# Patient Record
Sex: MALE | Race: WHITE | NOT HISPANIC OR LATINO | Employment: STUDENT | URBAN - METROPOLITAN AREA
[De-identification: names, ages, dates, MRNs, and addresses within clinical notes are randomized per-mention and may not be internally consistent; named-entity substitution may affect disease eponyms.]

---
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
## 2021-04-23 ENCOUNTER — OFFICE VISIT (OUTPATIENT)
Dept: FAMILY MEDICINE CLINIC | Facility: CLINIC | Age: 21
End: 2021-04-23
Payer: COMMERCIAL

## 2021-04-23 VITALS
WEIGHT: 156 LBS | DIASTOLIC BLOOD PRESSURE: 60 MMHG | OXYGEN SATURATION: 99 % | BODY MASS INDEX: 25.99 KG/M2 | SYSTOLIC BLOOD PRESSURE: 100 MMHG | HEIGHT: 65 IN | HEART RATE: 81 BPM | TEMPERATURE: 97.5 F

## 2021-04-23 DIAGNOSIS — Z00.01 ENCOUNTER FOR WELL ADULT EXAM WITH ABNORMAL FINDINGS: Primary | ICD-10-CM

## 2021-04-23 PROCEDURE — 99395 PREV VISIT EST AGE 18-39: CPT | Performed by: FAMILY MEDICINE

## 2021-04-23 NOTE — PROGRESS NOTES
ADULT ANNUAL PHYSICAL  [address] PRIMARY CARE [facility]    NAME: Ananth Dailey  AGE: 22 y o  SEX: male  : 2000     DATE: 2021     Assessment and Plan:     Problem List Items Addressed This Visit        Other    Encounter for well adult exam with abnormal findings - Primary      Advice and education were given regarding nutrition, aerobic exercises, weight bearing exercises, cardiovascular risk reduction, fall risk reduction, and age appropriate supplements  The patient was counseled regarding instructions for management, risk factor reductions, prognosis, risks and benefits of treatment options, patient and family education, and importance of compliance with treatment  BMI 26 0-26 9,adult     The BMI is above average  BMI counseling and education was provided to the patient  Nutrition recommendations include reducing portion sizes, decreasing overall calorie intake, 3-5 servings of fruits/vegetables daily, reducing fast food intake, consuming healthier snacks, decreasing soda and/or juice intake, moderation in carbohydrate intake and reducing intake of saturated fat and trans fat  Exercise recommendations include moderate aerobic physical activity for 150 minutes/week, exercising 3-5 times per week and joining a gym  Immunizations and preventive care screenings were discussed with patient today  Appropriate education was printed on patient's after visit summary  Counseling:  Alcohol/drug use: discussed moderation in alcohol intake, the recommendations for healthy alcohol use, and avoidance of illicit drug use  Dental Health: discussed importance of regular tooth brushing, flossing, and dental visits  Injury prevention: discussed safety/seat belts, safety helmets, smoke detectors, carbon dioxide detectors, and smoking near bedding or upholstery    Sexual health: discussed sexually transmitted diseases, partner selection, use of condoms, avoidance of unintended pregnancy, and contraceptive alternatives  · Exercise: the importance of regular exercise/physical activity was discussed  Recommend exercise 3-5 times per week for at least 30 minutes  BMI Counseling: Body mass index is 26 36 kg/m²  The BMI is above normal  Nutrition recommendations include decreasing portion sizes, decreasing fast food intake and limiting drinks that contain sugar  Exercise recommendations include exercising 3-5 times per week  No pharmacotherapy was ordered  No follow-ups on file  Chief Complaint:     Chief Complaint   Patient presents with    Physical Exam      History of Present Illness:     Adult Annual Physical   Patient here for a comprehensive physical exam  The patient reports no problems  Diet and Physical Activity  · Diet/Nutrition: well balanced diet  · Exercise: no formal exercise  Depression Screening  PHQ-9 Depression Screening    PHQ-9:   Frequency of the following problems over the past two weeks:      Little interest or pleasure in doing things: 0 - not at all  Feeling down, depressed, or hopeless: 0 - not at all  PHQ-2 Score: 0       General Health  · Sleep: sleeps well  · Hearing: normal - bilateral   · Vision: no vision problems  · Dental: regular dental visits   Health  · History of STDs?: no      Review of Systems:     Review of Systems   Constitutional: Negative for activity change, appetite change, fatigue and fever  HENT: Negative for congestion, ear pain, sinus pressure, sinus pain and sore throat  Eyes: Negative for pain, discharge, redness and itching  Respiratory: Negative for cough, chest tightness, shortness of breath and stridor  Cardiovascular: Negative for chest pain, palpitations and leg swelling  Gastrointestinal: Negative for abdominal pain, blood in stool, constipation, diarrhea and nausea  Endocrine: Negative for heat intolerance and polydipsia  Genitourinary: Negative for dysuria, flank pain, frequency and hematuria  Musculoskeletal: Negative for back pain, joint swelling and neck pain  Skin: Negative for pallor and rash  Neurological: Negative for dizziness, tremors, weakness, numbness and headaches  Hematological: Does not bruise/bleed easily  Psychiatric/Behavioral: Negative for agitation and behavioral problems        Past Medical History:     Past Medical History:   Diagnosis Date    Asthma       Past Surgical History:     Past Surgical History:   Procedure Laterality Date    OTHER SURGICAL HISTORY  11/2014    CHIN/CHEEK SURGERY      Social History:     E-Cigarette/Vaping    E-Cigarette Use Never User      E-Cigarette/Vaping Substances    Nicotine No     THC No     CBD No     Flavoring No     Other No     Unknown No      Social History     Socioeconomic History    Marital status: Single     Spouse name: None    Number of children: None    Years of education: None    Highest education level: None   Occupational History    None   Social Needs    Financial resource strain: Not hard at all   [address] insecurity     Worry: Never true     Inability: Never true    Transportation needs     Medical: No     Non-medical: No   Tobacco Use    Smoking status: Never Smoker    Smokeless tobacco: Never Used   Substance and Sexual Activity    Alcohol use: No     Frequency: Never     Binge frequency: Never    Drug use: No    Sexual activity: Never   Lifestyle    Physical activity     Days per week: 2 days     Minutes per session: 30 min    Stress: Not at all   Relationships    Social connections     Talks on phone: More than three times a week     Gets together: More than three times a week     Attends Bahai service: Never     Active member of club or organization: No     Attends meetings of clubs or organizations: Never     Relationship status: Never     Intimate partner violence     Fear of current or ex partner: No Emotionally abused: No     Physically abused: No     Forced sexual activity: No   Other Topics Concern    None   Social History Narrative    None      Family History:     Family History   Problem Relation Age of Onset    Hyperlipidemia Father     Hypertension Father     Depression Mother       Current Medications:     Current Outpatient Medications   Medication Sig Dispense Refill    albuterol (VENTOLIN HFA) 90 mcg/act inhaler inhale 2 puff by inhalation route  every 4 - 6 hours as needed       No current facility-administered medications for this visit  Allergies: Allergies   Allergen Reactions    No Known Allergies     Other       Physical Exam:     /60   Pulse 81   Temp 97 5 °F (36 4 °C) (Tympanic)   Ht 5' 4 5" (1 638 m)   Wt 70 8 kg (156 lb)   SpO2 99%   BMI 26 36 kg/m²     Physical Exam  Vitals signs and nursing note reviewed  Constitutional:       General: He is not in acute distress  Appearance: Normal appearance  He is not ill-appearing, toxic-appearing or diaphoretic  HENT:      Head: Normocephalic  Right Ear: Tympanic membrane, ear canal and external ear normal  There is no impacted cerumen  Left Ear: Ear canal and external ear normal  There is no impacted cerumen  Nose: Nose normal  No congestion or rhinorrhea  Mouth/Throat:      Mouth: Mucous membranes are moist       Pharynx: Oropharynx is clear  No oropharyngeal exudate or posterior oropharyngeal erythema  Eyes:      General:         Right eye: No discharge  Left eye: No discharge  Conjunctiva/sclera: Conjunctivae normal       Pupils: Pupils are equal, round, and reactive to light  Neck:      Musculoskeletal: Normal range of motion  Vascular: No JVD  Cardiovascular:      Rate and Rhythm: Normal rate and regular rhythm  Heart sounds: Normal heart sounds  No murmur  Pulmonary:      Effort: Pulmonary effort is normal       Breath sounds: Normal breath sounds   No wheezing or rales  Abdominal:      General: There is no distension  Palpations: Abdomen is soft  Tenderness: There is no abdominal tenderness  Hernia: No hernia is present  Genitourinary:     Scrotum/Testes: Normal    Musculoskeletal: Normal range of motion  General: No deformity  Right lower leg: No edema  Left lower leg: No edema  Lymphadenopathy:      Cervical: No cervical adenopathy  Skin:     General: Skin is warm  Coloration: Skin is not jaundiced  Findings: No bruising, erythema or rash  Neurological:      General: No focal deficit present  Mental Status: He is alert and oriented to person, place, and time  Sensory: No sensory deficit  Motor: No weakness        Gait: Gait normal    Psychiatric:         Mood and Affect: Mood normal          Behavior: Behavior normal           Reinier Berumen MD   [address] [place] [address]

## 2021-04-23 NOTE — PATIENT INSTRUCTIONS

## 2021-06-09 ENCOUNTER — OFFICE VISIT (OUTPATIENT)
Dept: FAMILY MEDICINE CLINIC | Facility: CLINIC | Age: 21
End: 2021-06-09
Payer: COMMERCIAL

## 2021-06-09 VITALS
OXYGEN SATURATION: 97 % | DIASTOLIC BLOOD PRESSURE: 66 MMHG | RESPIRATION RATE: 16 BRPM | HEIGHT: 65 IN | WEIGHT: 155.8 LBS | HEART RATE: 63 BPM | BODY MASS INDEX: 25.96 KG/M2 | SYSTOLIC BLOOD PRESSURE: 112 MMHG | TEMPERATURE: 97.6 F

## 2021-06-09 DIAGNOSIS — Z13.220 ENCOUNTER FOR SCREENING FOR LIPID DISORDER: ICD-10-CM

## 2021-06-09 DIAGNOSIS — R53.83 OTHER FATIGUE: Primary | ICD-10-CM

## 2021-06-09 PROCEDURE — 99214 OFFICE O/P EST MOD 30 MIN: CPT | Performed by: FAMILY MEDICINE

## 2021-06-09 NOTE — PROGRESS NOTES
Subjective:   Chief Complaint   Patient presents with    Fatigue        Patient ID: Eve Whitley is a 22 y o  male  Fatigue  This is a new problem  The problem occurs intermittently  The problem has been unchanged  Associated symptoms include fatigue  Pertinent negatives include no abdominal pain, arthralgias, change in bowel habit, chest pain, chills, congestion, coughing, fever, headaches, joint swelling, myalgias, nausea, neck pain, numbness, rash, sore throat, urinary symptoms, vertigo, visual change, vomiting or weakness  Exacerbated by: not sleeping well  He has tried nothing for the symptoms  The following portions of the patient's history were reviewed and updated as appropriate: allergies, current medications, past family history, past medical history, past social history, past surgical history and problem list     Review of Systems   Constitutional: Positive for fatigue  Negative for activity change, appetite change, chills and fever  HENT: Negative for congestion, ear pain, sinus pressure, sinus pain and sore throat  Eyes: Negative for pain, discharge, redness and itching  Respiratory: Negative for cough, chest tightness, shortness of breath and stridor  Cardiovascular: Negative for chest pain, palpitations and leg swelling  Gastrointestinal: Negative for abdominal pain, blood in stool, change in bowel habit, constipation, diarrhea, nausea and vomiting  Genitourinary: Negative for dysuria, flank pain, frequency and hematuria  Musculoskeletal: Negative for arthralgias, back pain, joint swelling, myalgias and neck pain  Skin: Negative for pallor and rash  Neurological: Negative for dizziness, vertigo, tremors, weakness, numbness and headaches  Hematological: Does not bruise/bleed easily               Objective:  Vitals:    06/09/21 1338   BP: 112/66   BP Location: Left arm   Patient Position: Sitting   Cuff Size: Adult   Pulse: 63   Resp: 16   Temp: 97 6 °F (36 4 °C) TempSrc: Tympanic   SpO2: 97%   Weight: 70 7 kg (155 lb 12 8 oz)   Height: 5' 4 5" (1 638 m)      Physical Exam  Vitals signs and nursing note reviewed  Constitutional:       General: He is not in acute distress  Appearance: Normal appearance  He is well-developed  He is not diaphoretic  HENT:      Head: Normocephalic  Right Ear: Tympanic membrane, ear canal and external ear normal       Left Ear: Tympanic membrane, ear canal and external ear normal       Nose: Nose normal  No congestion or rhinorrhea  Mouth/Throat:      Mouth: Mucous membranes are moist       Pharynx: Oropharynx is clear  No oropharyngeal exudate or posterior oropharyngeal erythema  Eyes:      General:         Right eye: No discharge  Left eye: No discharge  Conjunctiva/sclera: Conjunctivae normal    Neck:      Musculoskeletal: Normal range of motion and neck supple  Vascular: No JVD  Cardiovascular:      Rate and Rhythm: Normal rate and regular rhythm  Heart sounds: Normal heart sounds  No murmur  No gallop  Pulmonary:      Effort: Pulmonary effort is normal  No respiratory distress  Breath sounds: Normal breath sounds  No stridor  No wheezing or rales  Chest:      Chest wall: No tenderness  Abdominal:      General: There is no distension  Palpations: Abdomen is soft  There is no mass  Tenderness: There is no abdominal tenderness  There is no rebound  Musculoskeletal: Normal range of motion  General: No tenderness  Lymphadenopathy:      Cervical: No cervical adenopathy  Skin:     General: Skin is warm  Findings: No erythema or rash  Neurological:      Mental Status: He is alert and oriented to person, place, and time  Sensory: No sensory deficit        Gait: Gait normal    Psychiatric:         Mood and Affect: Mood normal          Behavior: Behavior normal            Assessment/Plan:    Other fatigue   A new diagnosis symptomatic discussed the patient workup including will out anemia thyroid problem Lyme disease and vitamin-D deficiency discussed the patient will hydration also discussed with the patient sleeping hygiene       Diagnoses and all orders for this visit:    Other fatigue  -     CBC and differential; Future  -     Basic metabolic panel; Future  -     Lipid Panel with Direct LDL reflex; Future  -     Vitamin D 25 hydroxy; Future  -     Lyme Total Antibody Profile with reflex to WB; Future    Encounter for screening for lipid disorder  -     Lipid Panel with Direct LDL reflex;  Future

## 2021-06-09 NOTE — ASSESSMENT & PLAN NOTE
A new diagnosis symptomatic discussed the patient workup including will out anemia thyroid problem Lyme disease and vitamin-D deficiency discussed the patient will hydration also discussed with the patient sleeping hygiene

## 2021-07-24 ENCOUNTER — APPOINTMENT (OUTPATIENT)
Dept: LAB | Facility: HOSPITAL | Age: 21
End: 2021-07-24
Payer: COMMERCIAL

## 2021-07-24 DIAGNOSIS — Z13.220 ENCOUNTER FOR SCREENING FOR LIPID DISORDER: ICD-10-CM

## 2021-07-24 DIAGNOSIS — R53.83 OTHER FATIGUE: ICD-10-CM

## 2021-07-24 LAB
25(OH)D3 SERPL-MCNC: 32.3 NG/ML (ref 30–100)
ANION GAP SERPL CALCULATED.3IONS-SCNC: 9 MMOL/L (ref 5–14)
BASOPHILS # BLD AUTO: 0.1 THOUSANDS/ΜL (ref 0–0.1)
BASOPHILS NFR BLD AUTO: 1 % (ref 0–1)
BUN SERPL-MCNC: 14 MG/DL (ref 5–25)
CALCIUM SERPL-MCNC: 9.6 MG/DL (ref 8.4–10.2)
CHLORIDE SERPL-SCNC: 101 MMOL/L (ref 97–108)
CHOLEST SERPL-MCNC: 163 MG/DL
CO2 SERPL-SCNC: 29 MMOL/L (ref 22–30)
CREAT SERPL-MCNC: 0.86 MG/DL (ref 0.7–1.5)
EOSINOPHIL # BLD AUTO: 0.1 THOUSAND/ΜL (ref 0–0.4)
EOSINOPHIL NFR BLD AUTO: 2 % (ref 0–6)
ERYTHROCYTE [DISTWIDTH] IN BLOOD BY AUTOMATED COUNT: 13 %
GFR SERPL CREATININE-BSD FRML MDRD: 125 ML/MIN/1.73SQ M
GLUCOSE P FAST SERPL-MCNC: 95 MG/DL (ref 70–99)
HCT VFR BLD AUTO: 42.8 % (ref 41–53)
HDLC SERPL-MCNC: 51 MG/DL
HGB BLD-MCNC: 14.6 G/DL (ref 13.5–17.5)
LDLC SERPL CALC-MCNC: 99 MG/DL
LYMPHOCYTES # BLD AUTO: 1.6 THOUSANDS/ΜL (ref 0.5–4)
LYMPHOCYTES NFR BLD AUTO: 32 % (ref 25–45)
MCH RBC QN AUTO: 28.5 PG (ref 26–34)
MCHC RBC AUTO-ENTMCNC: 34.2 G/DL (ref 31–36)
MCV RBC AUTO: 83 FL (ref 80–100)
MONOCYTES # BLD AUTO: 0.4 THOUSAND/ΜL (ref 0.2–0.9)
MONOCYTES NFR BLD AUTO: 9 % (ref 1–10)
NEUTROPHILS # BLD AUTO: 2.8 THOUSANDS/ΜL (ref 1.8–7.8)
NEUTS SEG NFR BLD AUTO: 57 % (ref 45–65)
PLATELET # BLD AUTO: 194 THOUSANDS/UL (ref 150–450)
PMV BLD AUTO: 9.7 FL (ref 8.9–12.7)
POTASSIUM SERPL-SCNC: 4 MMOL/L (ref 3.6–5)
RBC # BLD AUTO: 5.13 MILLION/UL (ref 4.5–5.9)
SODIUM SERPL-SCNC: 139 MMOL/L (ref 137–147)
TRIGL SERPL-MCNC: 65 MG/DL
WBC # BLD AUTO: 5 THOUSAND/UL (ref 4.5–11)

## 2021-07-24 PROCEDURE — 86618 LYME DISEASE ANTIBODY: CPT

## 2021-07-24 PROCEDURE — 85025 COMPLETE CBC W/AUTO DIFF WBC: CPT

## 2021-07-24 PROCEDURE — 36415 COLL VENOUS BLD VENIPUNCTURE: CPT

## 2021-07-24 PROCEDURE — 80061 LIPID PANEL: CPT

## 2021-07-24 PROCEDURE — 82306 VITAMIN D 25 HYDROXY: CPT

## 2021-07-24 PROCEDURE — 80048 BASIC METABOLIC PNL TOTAL CA: CPT

## 2021-07-27 LAB — B BURGDOR IGG+IGM SER-ACNC: 88

## 2022-04-29 ENCOUNTER — OFFICE VISIT (OUTPATIENT)
Dept: FAMILY MEDICINE CLINIC | Facility: CLINIC | Age: 22
End: 2022-04-29
Payer: COMMERCIAL

## 2022-04-29 VITALS
WEIGHT: 158 LBS | BODY MASS INDEX: 26.33 KG/M2 | HEART RATE: 62 BPM | HEIGHT: 65 IN | TEMPERATURE: 98.8 F | SYSTOLIC BLOOD PRESSURE: 100 MMHG | DIASTOLIC BLOOD PRESSURE: 80 MMHG | OXYGEN SATURATION: 100 %

## 2022-04-29 DIAGNOSIS — E66.3 OVERWEIGHT WITH BODY MASS INDEX (BMI) OF 26 TO 26.9 IN ADULT: ICD-10-CM

## 2022-04-29 DIAGNOSIS — Z00.01 ENCOUNTER FOR WELL ADULT EXAM WITH ABNORMAL FINDINGS: Primary | ICD-10-CM

## 2022-04-29 DIAGNOSIS — J45.20 MILD INTERMITTENT ASTHMA WITHOUT COMPLICATION: ICD-10-CM

## 2022-04-29 PROBLEM — L01.00 IMPETIGO: Status: RESOLVED | Noted: 2020-10-29 | Resolved: 2022-04-29

## 2022-04-29 PROBLEM — R53.83 OTHER FATIGUE: Status: RESOLVED | Noted: 2021-06-09 | Resolved: 2022-04-29

## 2022-04-29 PROCEDURE — 99395 PREV VISIT EST AGE 18-39: CPT | Performed by: FAMILY MEDICINE

## 2022-04-29 RX ORDER — FAMOTIDINE 20 MG/1
TABLET, FILM COATED ORAL
COMMUNITY
Start: 2022-04-22

## 2022-04-29 RX ORDER — LEVOCETIRIZINE DIHYDROCHLORIDE 5 MG/1
TABLET, FILM COATED ORAL
COMMUNITY
Start: 2022-02-01

## 2022-04-29 RX ORDER — ALBUTEROL SULFATE 90 UG/1
2 AEROSOL, METERED RESPIRATORY (INHALATION) EVERY 6 HOURS PRN
Qty: 18 G | Refills: 1 | Status: SHIPPED | OUTPATIENT
Start: 2022-04-29

## 2022-04-29 NOTE — PATIENT INSTRUCTIONS

## 2022-04-29 NOTE — ASSESSMENT & PLAN NOTE
Advice and education were given regarding nutrition, aerobic exercises, weight bearing exercises, cardiovascular risk reduction, fall risk reduction, and age appropriate supplements  The patient was counseled regarding instructions for management, risk factor reductions, prognosis, risks and benefits of treatment options, patient and family education, and importance of compliance with treatment       The patient's history of asthma I discussed with him per Pneumovax vaccine he declined for today

## 2022-04-29 NOTE — ASSESSMENT & PLAN NOTE
BMI 26 29 at discussed the patient portion control low carb low-fat diet and increased physical activity

## 2022-04-29 NOTE — PROGRESS NOTES
ADULT ANNUAL PHYSICAL  [address] PRIMARY CARE [facility]    NAME: Ananth Dailey  AGE: 25 y o  SEX: male  : 2000     DATE: 2022     Assessment and Plan:     Problem List Items Addressed This Visit        Respiratory    Mild intermittent asthma without complication     Chronic fair control patient rarely use albuterol inhaler recommend to have albuterol inhaler Handy at home to use it for emergency         Relevant Medications    albuterol (Ventolin HFA) 90 mcg/act inhaler       Other    Encounter for well adult exam with abnormal findings - Primary     Advice and education were given regarding nutrition, aerobic exercises, weight bearing exercises, cardiovascular risk reduction, fall risk reduction, and age appropriate supplements  The patient was counseled regarding instructions for management, risk factor reductions, prognosis, risks and benefits of treatment options, patient and family education, and importance of compliance with treatment  The patient's history of asthma I discussed with him per Pneumovax vaccine he declined for today         Overweight with body mass index (BMI) of 26 to 26 9 in adult     BMI 26 29 at discussed the patient portion control low carb low-fat diet and increased physical activity               Immunizations and preventive care screenings were discussed with patient today  Appropriate education was printed on patient's after visit summary  Counseling:  Alcohol/drug use: discussed moderation in alcohol intake, the recommendations for healthy alcohol use, and avoidance of illicit drug use  Dental Health: discussed importance of regular tooth brushing, flossing, and dental visits  Injury prevention: discussed safety/seat belts, safety helmets, smoke detectors, carbon dioxide detectors, and smoking near bedding or upholstery    Sexual health: discussed sexually transmitted diseases, partner selection, use of condoms, avoidance of unintended pregnancy, and contraceptive alternatives  Exercise: the importance of regular exercise/physical activity was discussed  Recommend exercise 3-5 times per week for at least 30 minutes  BMI Counseling: Body mass index is 26 29 kg/m²  The BMI is above normal  Nutrition recommendations include decreasing portion sizes, decreasing fast food intake and limiting drinks that contain sugar  Exercise recommendations include exercising 3-5 times per week  No pharmacotherapy was ordered  Rationale for BMI follow-up plan is due to patient being overweight or obese  Depression Screening and Follow-up Plan: Patient was screened for depression during today's encounter  They screened negative with a PHQ-2 score of 0  No follow-ups on file  Chief Complaint:     Chief Complaint   Patient presents with    Physical Exam      History of Present Illness:     Adult Annual Physical   Patient here for a comprehensive physical exam  The patient reports no problems  Diet and Physical Activity  Diet/Nutrition: no special diet  Exercise: 1-2 times a week on average  Depression Screening  PHQ-2/9 Depression Screening    Little interest or pleasure in doing things: 0 - not at all  Feeling down, depressed, or hopeless: 0 - not at all  PHQ-2 Score: 0  PHQ-2 Interpretation: Negative depression screen       General Health  Sleep: sleeps well  Hearing: normal - bilateral   Vision: no vision problems  Dental: regular dental visits   Health  History of STDs?: no      Review of Systems:     Review of Systems   Constitutional: Negative for activity change, appetite change, fatigue and fever  HENT: Negative for congestion, ear pain, sinus pressure, sinus pain and sore throat  Eyes: Negative for pain, discharge, redness and itching  Respiratory: Negative for cough, chest tightness, shortness of breath and stridor      Cardiovascular: Negative for chest pain, palpitations and leg swelling  Gastrointestinal: Negative for abdominal pain, blood in stool, constipation, diarrhea and nausea  Genitourinary: Negative for dysuria, flank pain, frequency and hematuria  Musculoskeletal: Negative for back pain, joint swelling and neck pain  Skin: Negative for pallor and rash  Neurological: Negative for dizziness, tremors, weakness, numbness and headaches  Hematological: Does not bruise/bleed easily  Past Medical History:     Past Medical History:   Diagnosis Date    Asthma     Impetigo 10/29/2020    Other fatigue 6/9/2021      Past Surgical History:     Past Surgical History:   Procedure Laterality Date    OTHER SURGICAL HISTORY  11/2014    CHIN/CHEEK SURGERY      Social History:     Social History     Socioeconomic History    Marital status: Single     Spouse name: None    Number of children: None    Years of education: None    Highest education level: None   Occupational History    None   Tobacco Use    Smoking status: Never Smoker    Smokeless tobacco: Never Used   Vaping Use    Vaping Use: Never used   Substance and Sexual Activity    Alcohol use: No    Drug use: No    Sexual activity: Never   Other Topics Concern    None   Social History Narrative    None     Social Determinants of Health     Financial Resource Strain: Low Risk     Difficulty of Paying Living Expenses: Not hard at all   Food Insecurity: No Food Insecurity    Worried About Running Out of Food in the Last Year: Never true    Jadiel of Food in the Last Year: Never true   Transportation Needs: No Transportation Needs    Lack of Transportation (Medical): No    Lack of Transportation (Non-Medical):  No   Physical Activity: Not on file   Stress: Not on file   Social Connections: Not on file   Intimate Partner Violence: Not on file   Housing Stability: Not on file      Family History:     Family History   Problem Relation Age of Onset    Hyperlipidemia Father     Hypertension Father    Yamile Tavarez Depression Mother       Current Medications:     Current Outpatient Medications   Medication Sig Dispense Refill    famotidine (PEPCID) 20 mg tablet       levocetirizine (XYZAL) 5 MG tablet TAKE 1 TABLET (5 MG) BY MOUTH ONE TIME TO 4 TIMES DAILY FOR HIVES      albuterol (Ventolin HFA) 90 mcg/act inhaler Inhale 2 puffs every 6 (six) hours as needed for wheezing 18 g 1     No current facility-administered medications for this visit  Allergies: Allergies   Allergen Reactions    No Known Allergies     Other       Physical Exam:     /80   Pulse 62   Temp 98 8 °F (37 1 °C) (Tympanic)   Ht 5' 5" (1 651 m)   Wt 71 7 kg (158 lb)   SpO2 100%   BMI 26 29 kg/m²     Physical Exam  Vitals and nursing note reviewed  Constitutional:       General: He is not in acute distress  Appearance: Normal appearance  He is not ill-appearing, toxic-appearing or diaphoretic  HENT:      Head: Normocephalic  Right Ear: Tympanic membrane, ear canal and external ear normal  There is no impacted cerumen  Left Ear: Ear canal and external ear normal  There is no impacted cerumen  Nose: Nose normal  No congestion or rhinorrhea  Mouth/Throat:      Mouth: Mucous membranes are moist       Pharynx: Oropharynx is clear  No oropharyngeal exudate or posterior oropharyngeal erythema  Eyes:      General:         Right eye: No discharge  Left eye: No discharge  Conjunctiva/sclera: Conjunctivae normal       Pupils: Pupils are equal, round, and reactive to light  Neck:      Vascular: No JVD  Cardiovascular:      Rate and Rhythm: Normal rate and regular rhythm  Heart sounds: Normal heart sounds  No murmur heard  Pulmonary:      Effort: Pulmonary effort is normal       Breath sounds: Normal breath sounds  No wheezing or rales  Abdominal:      General: There is no distension  Palpations: Abdomen is soft  Tenderness: There is no abdominal tenderness        Hernia: No hernia is present  Musculoskeletal:         General: No deformity  Normal range of motion  Cervical back: Normal range of motion  Right lower leg: No edema  Left lower leg: No edema  Lymphadenopathy:      Cervical: No cervical adenopathy  Skin:     General: Skin is warm  Coloration: Skin is not jaundiced  Findings: No bruising, erythema or rash  Neurological:      General: No focal deficit present  Mental Status: He is alert and oriented to person, place, and time  Sensory: No sensory deficit  Motor: No weakness        Gait: Gait normal    Psychiatric:         Mood and Affect: Mood normal          Behavior: Behavior normal           Peggy Wu MD   [address] [place] [address]

## 2022-04-29 NOTE — ASSESSMENT & PLAN NOTE
Chronic fair control patient rarely use albuterol inhaler recommend to have albuterol inhaler Handy at home to use it for emergency

## 2022-07-27 DIAGNOSIS — Z11.1 SCREENING-PULMONARY TB: Primary | ICD-10-CM

## 2022-08-01 ENCOUNTER — APPOINTMENT (OUTPATIENT)
Dept: LAB | Facility: HOSPITAL | Age: 22
End: 2022-08-01
Payer: COMMERCIAL

## 2022-08-01 DIAGNOSIS — Z11.1 SCREENING-PULMONARY TB: ICD-10-CM

## 2022-08-01 PROCEDURE — 86480 TB TEST CELL IMMUN MEASURE: CPT

## 2022-08-01 PROCEDURE — 36415 COLL VENOUS BLD VENIPUNCTURE: CPT

## 2022-08-03 LAB
GAMMA INTERFERON BACKGROUND BLD IA-ACNC: 0.03 IU/ML
M TB IFN-G BLD-IMP: NEGATIVE
M TB IFN-G CD4+ BCKGRND COR BLD-ACNC: -0.01 IU/ML
M TB IFN-G CD4+ BCKGRND COR BLD-ACNC: 0 IU/ML
MITOGEN IGNF BCKGRD COR BLD-ACNC: 9.84 IU/ML

## 2022-08-29 ENCOUNTER — TELEPHONE (OUTPATIENT)
Dept: FAMILY MEDICINE CLINIC | Facility: CLINIC | Age: 22
End: 2022-08-29

## 2022-08-29 DIAGNOSIS — R19.7 DIARRHEA, UNSPECIFIED TYPE: Primary | ICD-10-CM

## 2022-08-29 NOTE — TELEPHONE ENCOUNTER
Having GI intolerance to dairy product, GI upset and Diarrhea  [facility]      Please review  Thank you

## 2022-08-29 NOTE — TELEPHONE ENCOUNTER
Patient informed of Dr Wendy Zuluaga recommendations  Patient would like to be referred to GI for evaluation  GI referral placed

## 2023-01-06 ENCOUNTER — TELEPHONE (OUTPATIENT)
Dept: FAMILY MEDICINE CLINIC | Facility: CLINIC | Age: 23
End: 2023-01-06

## 2023-01-06 DIAGNOSIS — Z11.1 SCREENING-PULMONARY TB: Primary | ICD-10-CM

## 2023-01-06 NOTE — TELEPHONE ENCOUNTER
Patient's father called, patient needs a TB test for TERRI HENDRICKSON  [facility]  He is asking if you can order this for him to have done    Thank you

## 2023-06-09 ENCOUNTER — OFFICE VISIT (OUTPATIENT)
Dept: FAMILY MEDICINE CLINIC | Facility: CLINIC | Age: 23
End: 2023-06-09
Payer: COMMERCIAL

## 2023-06-09 VITALS
DIASTOLIC BLOOD PRESSURE: 60 MMHG | HEART RATE: 67 BPM | WEIGHT: 155 LBS | OXYGEN SATURATION: 98 % | BODY MASS INDEX: 25.83 KG/M2 | HEIGHT: 65 IN | TEMPERATURE: 96.6 F | SYSTOLIC BLOOD PRESSURE: 100 MMHG

## 2023-06-09 DIAGNOSIS — E66.3 OVERWEIGHT (BMI 25.0-29.9): ICD-10-CM

## 2023-06-09 DIAGNOSIS — Z00.01 ENCOUNTER FOR WELL ADULT EXAM WITH ABNORMAL FINDINGS: Primary | ICD-10-CM

## 2023-06-09 DIAGNOSIS — J45.20 MILD INTERMITTENT ASTHMA WITHOUT COMPLICATION: ICD-10-CM

## 2023-06-09 DIAGNOSIS — J30.1 SEASONAL ALLERGIC RHINITIS DUE TO POLLEN: ICD-10-CM

## 2023-06-09 PROCEDURE — 99395 PREV VISIT EST AGE 18-39: CPT | Performed by: FAMILY MEDICINE

## 2023-06-09 RX ORDER — FAMOTIDINE 20 MG/1
20 TABLET, FILM COATED ORAL DAILY
Qty: 90 TABLET | Refills: 0 | Status: SHIPPED | OUTPATIENT
Start: 2023-06-09

## 2023-06-09 RX ORDER — LEVOCETIRIZINE DIHYDROCHLORIDE 5 MG/1
5 TABLET, FILM COATED ORAL 4 TIMES DAILY PRN
Qty: 90 TABLET | Refills: 0 | Status: CANCELLED | OUTPATIENT
Start: 2023-06-09

## 2023-06-09 RX ORDER — LEVOCETIRIZINE DIHYDROCHLORIDE 5 MG/1
5 TABLET, FILM COATED ORAL EVERY EVENING
Qty: 90 TABLET | Refills: 0 | Status: SHIPPED | OUTPATIENT
Start: 2023-06-09

## 2023-06-09 NOTE — ASSESSMENT & PLAN NOTE
BMI today 25 79 discussed with the patient portion control low-carb low-fat diet increase physical activity improved compared with before

## 2023-06-09 NOTE — PROGRESS NOTES
ADULT ANNUAL PHYSICAL  [address] PRIMARY CARE [facility]    NAME: Ananth Dailey  AGE: 26 y o  SEX: male  : 2000     DATE: 2023     Assessment and Plan:     Problem List Items Addressed This Visit        Respiratory    Allergic rhinitis    Relevant Medications    famotidine (PEPCID) 20 mg tablet    levocetirizine (XYZAL) 5 MG tablet    Mild intermittent asthma without complication       Other    Encounter for well adult exam with abnormal findings - Primary     Advice and education were given regarding nutrition, aerobic exercises, weight-bearing exercises, cardiovascular risk reduction, fall risk reduction, and age-appropriate supplements  The patient was counseled regarding instructions for management, risk factor reductions, prognosis, risks and benefits of treatment options, patient and family education, and importance of compliance with treatment  Overweight (BMI 25 0-29  9)     BMI today 25 79 discussed with the patient portion control low-carb low-fat diet increase physical activity improved compared with before            Immunizations and preventive care screenings were discussed with patient today  Appropriate education was printed on patient's after visit summary  Counseling:  Alcohol/drug use: discussed moderation in alcohol intake, the recommendations for healthy alcohol use, and avoidance of illicit drug use  Dental Health: discussed importance of regular tooth brushing, flossing, and dental visits  Injury prevention: discussed safety/seat belts, safety helmets, smoke detectors, carbon dioxide detectors, and smoking near bedding or upholstery  Sexual health: discussed sexually transmitted diseases, partner selection, use of condoms, avoidance of unintended pregnancy, and contraceptive alternatives  Exercise: the importance of regular exercise/physical activity was discussed   Recommend exercise 3-5 times per week for at least 30 minutes  BMI Counseling: Body mass index is 25 79 kg/m²  The BMI is above normal  Nutrition recommendations include decreasing portion sizes, decreasing fast food intake and limiting drinks that contain sugar  Exercise recommendations include exercising 3-5 times per week  No pharmacotherapy was ordered  Rationale for BMI follow-up plan is due to patient being overweight or obese  Depression Screening and Follow-up Plan: Patient was screened for depression during today's encounter  They screened negative with a PHQ-2 score of 0  Return in about 1 year (around 6/9/2024)  Chief Complaint:     Chief Complaint   Patient presents with   • Physical Exam      History of Present Illness:     Adult Annual Physical   Patient here for a comprehensive physical exam  The patient reports no problems  Diet and Physical Activity  Diet/Nutrition: well balanced diet  Exercise: 1-2 times a week on average  Depression Screening  PHQ-2/9 Depression Screening    Little interest or pleasure in doing things: 0 - not at all  Feeling down, depressed, or hopeless: 0 - not at all  PHQ-2 Score: 0  PHQ-2 Interpretation: Negative depression screen       General Health  Sleep: sleeps well  Hearing: normal - bilateral   Vision: no vision problems  Dental: regular dental visits   Health  History of STDs?: no      Review of Systems:     Review of Systems   Constitutional: Negative for chills and fever  HENT: Negative for ear pain and sore throat  Eyes: Negative for pain and visual disturbance  Respiratory: Negative for cough and shortness of breath  Cardiovascular: Negative for chest pain and palpitations  Gastrointestinal: Negative for abdominal pain, constipation, diarrhea and vomiting  Genitourinary: Negative for dysuria and hematuria  Musculoskeletal: Negative for arthralgias and back pain  Skin: Negative for color change and rash     Neurological: Negative for seizures and syncope  Hematological: Does not bruise/bleed easily  Psychiatric/Behavioral: Negative for behavioral problems  All other systems reviewed and are negative  Past Medical History:     Past Medical History:   Diagnosis Date   • Asthma    • Impetigo 10/29/2020   • Other fatigue 6/9/2021      Past Surgical History:     Past Surgical History:   Procedure Laterality Date   • OTHER SURGICAL HISTORY  11/2014    CHIN/CHEEK SURGERY      Social History:     Social History     Socioeconomic History   • Marital status: Single     Spouse name: None   • Number of children: None   • Years of education: None   • Highest education level: None   Occupational History   • None   Tobacco Use   • Smoking status: Never   • Smokeless tobacco: Never   Vaping Use   • Vaping Use: Never used   Substance and Sexual Activity   • Alcohol use: Yes     Comment: social   • Drug use: No   • Sexual activity: Never   Other Topics Concern   • None   Social History Narrative   • None     Social Determinants of Health     Financial Resource Strain: Low Risk  (6/9/2021)    Overall Financial Resource Strain (CARDIA)    • Difficulty of Paying Living Expenses: Not hard at all   Food Insecurity: No Food Insecurity (6/9/2021)    Hunger Vital Sign    • Worried About Running Out of Food in the Last Year: Never true    • Ran Out of Food in the Last Year: Never true   Transportation Needs: No Transportation Needs (6/9/2021)    PRAPARE - Transportation    • Lack of Transportation (Medical): No    • Lack of Transportation (Non-Medical):  No   Physical Activity: Insufficiently Active (4/23/2021)    Exercise Vital Sign    • Days of Exercise per Week: 2 days    • Minutes of Exercise per Session: 30 min   Stress: No Stress Concern Present (4/23/2021)    [address]    • Feeling of Stress : Not at all   Social Connections: Socially Isolated (4/23/2021)    Social Connection and Isolation Panel "[NHANES]    • Frequency of Communication with Friends and Family: More than three times a week    • Frequency of Social Gatherings with Friends and Family: More than three times a week    • Attends Sabianism Services: Never    • Active Member of Clubs or Organizations: No    • Attends Club or Organization Meetings: Never    • Marital Status: Never    Intimate Partner Violence: Not At Risk (4/23/2021)    Humiliation, Afraid, Rape, and Kick questionnaire    • Fear of Current or Ex-Partner: No    • Emotionally Abused: No    • Physically Abused: No    • Sexually Abused: No   Housing Stability: Not on file      Family History:     Family History   Problem Relation Age of Onset   • Hyperlipidemia Father    • Hypertension Father    • Depression Mother       Current Medications:     Current Outpatient Medications   Medication Sig Dispense Refill   • famotidine (PEPCID) 20 mg tablet Take 1 tablet (20 mg total) by mouth daily 90 tablet 0   • levocetirizine (XYZAL) 5 MG tablet Take 1 tablet (5 mg total) by mouth every evening 90 tablet 0   • albuterol (Ventolin HFA) 90 mcg/act inhaler Inhale 2 puffs every 6 (six) hours as needed for wheezing 18 g 1     No current facility-administered medications for this visit  Allergies: Allergies   Allergen Reactions   • No Known Allergies    • Other       Physical Exam:     /60 (BP Location: Left arm, Patient Position: Sitting)   Pulse 67   Temp (!) 96 6 °F (35 9 °C) (Tympanic)   Ht 5' 5\" (1 651 m)   Wt 70 3 kg (155 lb)   SpO2 98%   BMI 25 79 kg/m²     Physical Exam  Vitals and nursing note reviewed  Constitutional:       General: He is not in acute distress  Appearance: He is well-developed  HENT:      Head: Normocephalic and atraumatic  Right Ear: Tympanic membrane normal  There is no impacted cerumen  Left Ear: Tympanic membrane normal  There is no impacted cerumen  Nose: No congestion        Mouth/Throat:      Pharynx: No oropharyngeal " exudate  Eyes:      General:         Right eye: No discharge  Left eye: No discharge  Conjunctiva/sclera: Conjunctivae normal    Cardiovascular:      Rate and Rhythm: Normal rate and regular rhythm  Heart sounds: No murmur heard  Pulmonary:      Effort: Pulmonary effort is normal  No respiratory distress  Breath sounds: Normal breath sounds  Abdominal:      Palpations: Abdomen is soft  Tenderness: There is no abdominal tenderness  Musculoskeletal:         General: No swelling  Cervical back: Neck supple  Skin:     General: Skin is warm and dry  Capillary Refill: Capillary refill takes less than 2 seconds  Findings: No erythema or rash  Neurological:      Mental Status: He is alert and oriented to person, place, and time     Psychiatric:         Mood and Affect: Mood normal           Dayana Brizuela MD   [address] [place] [address]

## 2023-08-22 ENCOUNTER — APPOINTMENT (OUTPATIENT)
Dept: LAB | Facility: HOSPITAL | Age: 23
End: 2023-08-22
Payer: COMMERCIAL

## 2023-08-22 DIAGNOSIS — Z11.1 SCREENING-PULMONARY TB: ICD-10-CM

## 2023-08-22 PROCEDURE — 36415 COLL VENOUS BLD VENIPUNCTURE: CPT

## 2023-08-22 PROCEDURE — 86480 TB TEST CELL IMMUN MEASURE: CPT

## 2023-08-24 LAB
GAMMA INTERFERON BACKGROUND BLD IA-ACNC: 0.02 IU/ML
M TB IFN-G BLD-IMP: NEGATIVE
M TB IFN-G CD4+ BCKGRND COR BLD-ACNC: 0 IU/ML
M TB IFN-G CD4+ BCKGRND COR BLD-ACNC: 0 IU/ML
MITOGEN IGNF BCKGRD COR BLD-ACNC: >10 IU/ML

## 2023-11-17 ENCOUNTER — OFFICE VISIT (OUTPATIENT)
Dept: FAMILY MEDICINE CLINIC | Facility: CLINIC | Age: 23
End: 2023-11-17
Payer: COMMERCIAL

## 2023-11-17 VITALS
SYSTOLIC BLOOD PRESSURE: 100 MMHG | HEIGHT: 65 IN | HEART RATE: 59 BPM | TEMPERATURE: 98 F | WEIGHT: 154 LBS | BODY MASS INDEX: 25.66 KG/M2 | DIASTOLIC BLOOD PRESSURE: 70 MMHG | OXYGEN SATURATION: 100 %

## 2023-11-17 DIAGNOSIS — Z23 ENCOUNTER FOR IMMUNIZATION: ICD-10-CM

## 2023-11-17 DIAGNOSIS — R55 SYNCOPE, UNSPECIFIED SYNCOPE TYPE: Primary | ICD-10-CM

## 2023-11-17 DIAGNOSIS — Z23 NEED FOR INFLUENZA VACCINATION: ICD-10-CM

## 2023-11-17 PROCEDURE — 90686 IIV4 VACC NO PRSV 0.5 ML IM: CPT | Performed by: FAMILY MEDICINE

## 2023-11-17 PROCEDURE — 90471 IMMUNIZATION ADMIN: CPT | Performed by: FAMILY MEDICINE

## 2023-11-17 PROCEDURE — 99214 OFFICE O/P EST MOD 30 MIN: CPT | Performed by: FAMILY MEDICINE

## 2023-11-17 PROCEDURE — 93000 ELECTROCARDIOGRAM COMPLETE: CPT | Performed by: FAMILY MEDICINE

## 2023-11-17 NOTE — PROGRESS NOTES
Name: Gloria Mcneil      : 2000      MRN: [id number]  Encounter Provider: Concetta Osorio MD  Encounter Date: 2023   Encounter department: [address] [place] [address]     1. Syncope, unspecified syncope type  Assessment & Plan:  New diagnosis symptomatic patient loss conscious for couple seconds no lose control of the urine or stool no headache, no fatigue and he wake up there is a family history of mother has a cardiac condition  EKG in the office normal sinus rhythm recommend to do Holter monitor for 4 weeks echocardiogram plan to do blood work to rule out anemia electrolyte and check sugar level plan for MRI of the brain there is a positive family history of seizure disorder    Orders:  -     POCT ECG  -     MRI brain wo contrast; Future; Expected date: 2023  -     Echo complete w/ contrast if indicated; Future; Expected date: 2023  -     Holter monitor; Future; Expected date: 2023  -     CBC and differential; Future  -     Lipid panel; Future  -     Basic metabolic panel; Future  -     TSH, 3rd generation with Free T4 reflex; Future    2. Encounter for immunization  -     FLUZONE: influenza vaccine, quadrivalent, 0.5 mL    3. Need for influenza vaccination  -     FLUZONE: influenza vaccine, quadrivalent, 0.5 mL        Depression Screening and Follow-up Plan: Patient was screened for depression during today's encounter. They screened negative with a PHQ-2 score of 0.         Subjective      Patient here in the office concerned about the symptoms conscious sedation patient he was visiting his get [facility] and [facility] to stretch and atorvastatin decompensated on the floor but he lost his consciousness for couple seconds when he wake up and noticed control of the urine or stool he did not feel fatigue and this is the first time happened to him no nausea no vomiting no diarrhea he is not drinking enough water there is a positive family history of his mother has a cardiac problem       Review of Systems   Constitutional:  Negative for chills and fever. HENT:  Negative for ear pain and sore throat. Eyes:  Negative for pain and visual disturbance. Respiratory:  Negative for cough and shortness of breath. Cardiovascular:  Negative for chest pain and palpitations. Gastrointestinal:  Negative for abdominal pain, blood in stool, constipation, diarrhea and vomiting. Genitourinary:  Negative for dysuria and hematuria. Musculoskeletal:  Negative for arthralgias and back pain. Skin:  Negative for color change and rash. Neurological:  Positive for syncope. Negative for dizziness, tremors, seizures, weakness, light-headedness, numbness and headaches. Psychiatric/Behavioral:  Negative for behavioral problems. All other systems reviewed and are negative. Current Outpatient Medications on File Prior to Visit   Medication Sig   • famotidine (PEPCID) 20 mg tablet Take 1 tablet (20 mg total) by mouth daily   • levocetirizine (XYZAL) 5 MG tablet Take 1 tablet (5 mg total) by mouth every evening       Objective     /70 (BP Location: Left arm, Patient Position: Sitting)   Pulse 59   Temp 98 °F (36.7 °C) (Tympanic)   Ht 5' 4.5" (1.638 m)   Wt 69.9 kg (154 lb)   SpO2 100%   BMI 26.03 kg/m²     Physical Exam  Vitals and nursing note reviewed. Constitutional:       General: He is not in acute distress. Appearance: Normal appearance. He is well-developed. He is not diaphoretic. HENT:      Head: Normocephalic. Right Ear: Tympanic membrane, ear canal and external ear normal.      Left Ear: Tympanic membrane, ear canal and external ear normal.      Nose: Nose normal. No congestion or rhinorrhea. Mouth/Throat:      Mouth: Mucous membranes are moist.      Pharynx: Oropharynx is clear. No oropharyngeal exudate or posterior oropharyngeal erythema. Eyes:      General:         Right eye: No discharge. Left eye: No discharge. Conjunctiva/sclera: Conjunctivae normal.   Neck:      Vascular: No JVD. Cardiovascular:      Rate and Rhythm: Normal rate and regular rhythm. Heart sounds: Normal heart sounds. No murmur heard. No gallop. Pulmonary:      Effort: Pulmonary effort is normal. No respiratory distress. Breath sounds: Normal breath sounds. No stridor. No wheezing or rales. Chest:      Chest wall: No tenderness. Abdominal:      General: There is no distension. Palpations: Abdomen is soft. There is no mass. Tenderness: There is no abdominal tenderness. There is no rebound. Musculoskeletal:         General: No tenderness. Normal range of motion. Cervical back: Normal range of motion and neck supple. Lymphadenopathy:      Cervical: No cervical adenopathy. Skin:     General: Skin is warm. Findings: No erythema or rash. Neurological:      Mental Status: He is alert and oriented to person, place, and time. Cranial Nerves: No cranial nerve deficit. Sensory: No sensory deficit. Motor: No weakness.       Coordination: Coordination normal.      Gait: Gait normal.      Deep Tendon Reflexes: Reflexes normal.   Psychiatric:         Mood and Affect: Mood normal.         Behavior: Behavior normal.       Marian Rashid MD

## 2023-11-17 NOTE — ASSESSMENT & PLAN NOTE
New diagnosis symptomatic patient loss conscious for couple seconds no lose control of the urine or stool no headache, no fatigue and he wake up there is a family history of mother has a cardiac condition  EKG in the office normal sinus rhythm recommend to do Holter monitor for 4 weeks echocardiogram plan to do blood work to rule out anemia electrolyte and check sugar level plan for MRI of the brain there is a positive family history of seizure disorder

## 2023-11-27 ENCOUNTER — APPOINTMENT (OUTPATIENT)
Dept: LAB | Facility: HOSPITAL | Age: 23
End: 2023-11-27
Payer: COMMERCIAL

## 2023-11-27 DIAGNOSIS — T78.1XXA POLLEN-FOOD ALLERGY, INITIAL ENCOUNTER: ICD-10-CM

## 2023-11-27 DIAGNOSIS — L50.1 IDIOPATHIC URTICARIA: ICD-10-CM

## 2023-11-27 DIAGNOSIS — R55 SYNCOPE, UNSPECIFIED SYNCOPE TYPE: ICD-10-CM

## 2023-11-27 DIAGNOSIS — J30.1 SEASONAL ALLERGIC RHINITIS DUE TO POLLEN: Primary | ICD-10-CM

## 2023-11-27 LAB
ANION GAP SERPL CALCULATED.3IONS-SCNC: 9 MMOL/L
BASOPHILS # BLD AUTO: 0.05 THOUSANDS/ÂΜL (ref 0–0.1)
BASOPHILS NFR BLD AUTO: 1 % (ref 0–1)
BUN SERPL-MCNC: 11 MG/DL (ref 5–25)
CALCIUM SERPL-MCNC: 10.2 MG/DL (ref 8.4–10.2)
CHLORIDE SERPL-SCNC: 100 MMOL/L (ref 96–108)
CHOLEST SERPL-MCNC: 171 MG/DL
CO2 SERPL-SCNC: 28 MMOL/L (ref 21–32)
CREAT SERPL-MCNC: 1 MG/DL (ref 0.6–1.3)
EOSINOPHIL # BLD AUTO: 0.07 THOUSAND/ÂΜL (ref 0–0.61)
EOSINOPHIL NFR BLD AUTO: 1 % (ref 0–6)
ERYTHROCYTE [DISTWIDTH] IN BLOOD BY AUTOMATED COUNT: 11.9 % (ref 11.6–15.1)
GFR SERPL CREATININE-BSD FRML MDRD: 106 ML/MIN/1.73SQ M
GLUCOSE P FAST SERPL-MCNC: 88 MG/DL (ref 65–99)
HCT VFR BLD AUTO: 45.5 % (ref 36.5–49.3)
HDLC SERPL-MCNC: 65 MG/DL
HGB BLD-MCNC: 15.3 G/DL (ref 12–17)
IMM GRANULOCYTES # BLD AUTO: 0.02 THOUSAND/UL (ref 0–0.2)
IMM GRANULOCYTES NFR BLD AUTO: 0 % (ref 0–2)
LDLC SERPL CALC-MCNC: 89 MG/DL (ref 0–100)
LYMPHOCYTES # BLD AUTO: 1.59 THOUSANDS/ÂΜL (ref 0.6–4.47)
LYMPHOCYTES NFR BLD AUTO: 24 % (ref 14–44)
MCH RBC QN AUTO: 28.2 PG (ref 26.8–34.3)
MCHC RBC AUTO-ENTMCNC: 33.6 G/DL (ref 31.4–37.4)
MCV RBC AUTO: 84 FL (ref 82–98)
MONOCYTES # BLD AUTO: 0.45 THOUSAND/ÂΜL (ref 0.17–1.22)
MONOCYTES NFR BLD AUTO: 7 % (ref 4–12)
NEUTROPHILS # BLD AUTO: 4.59 THOUSANDS/ÂΜL (ref 1.85–7.62)
NEUTS SEG NFR BLD AUTO: 67 % (ref 43–75)
NONHDLC SERPL-MCNC: 106 MG/DL
NRBC BLD AUTO-RTO: 0 /100 WBCS
PLATELET # BLD AUTO: 245 THOUSANDS/UL (ref 149–390)
PMV BLD AUTO: 10.9 FL (ref 8.9–12.7)
POTASSIUM SERPL-SCNC: 3.9 MMOL/L (ref 3.5–5.3)
RBC # BLD AUTO: 5.42 MILLION/UL (ref 3.88–5.62)
SODIUM SERPL-SCNC: 137 MMOL/L (ref 135–147)
TRIGL SERPL-MCNC: 84 MG/DL
TSH SERPL DL<=0.05 MIU/L-ACNC: 0.9 UIU/ML (ref 0.45–4.5)
WBC # BLD AUTO: 6.77 THOUSAND/UL (ref 4.31–10.16)

## 2023-11-27 PROCEDURE — 80048 BASIC METABOLIC PNL TOTAL CA: CPT

## 2023-11-27 PROCEDURE — 85025 COMPLETE CBC W/AUTO DIFF WBC: CPT

## 2023-11-27 PROCEDURE — 36415 COLL VENOUS BLD VENIPUNCTURE: CPT

## 2023-11-27 PROCEDURE — 86008 ALLG SPEC IGE RECOMB EA: CPT

## 2023-11-27 PROCEDURE — 86003 ALLG SPEC IGE CRUDE XTRC EA: CPT

## 2023-11-27 PROCEDURE — 84443 ASSAY THYROID STIM HORMONE: CPT

## 2023-11-27 PROCEDURE — 80061 LIPID PANEL: CPT

## 2023-11-27 PROCEDURE — 82785 ASSAY OF IGE: CPT

## 2023-11-30 ENCOUNTER — HOSPITAL ENCOUNTER (EMERGENCY)
Facility: HOSPITAL | Age: 23
Discharge: HOME | End: 2023-11-30
Attending: EMERGENCY MEDICINE
Payer: COMMERCIAL

## 2023-11-30 ENCOUNTER — APPOINTMENT (EMERGENCY)
Dept: RADIOLOGY | Facility: HOSPITAL | Age: 23
End: 2023-11-30
Payer: COMMERCIAL

## 2023-11-30 VITALS
SYSTOLIC BLOOD PRESSURE: 133 MMHG | BODY MASS INDEX: 26.04 KG/M2 | RESPIRATION RATE: 19 BRPM | HEART RATE: 81 BPM | TEMPERATURE: 98.1 F | OXYGEN SATURATION: 100 % | DIASTOLIC BLOOD PRESSURE: 65 MMHG | WEIGHT: 154.1 LBS

## 2023-11-30 DIAGNOSIS — J40 BRONCHITIS: Primary | ICD-10-CM

## 2023-11-30 LAB
A ALTERNATA IGE QN: <0.1 KUA/I
A FUMIGATUS IGE QN: <0.1 KUA/I
A-LACTALB IGE QN: <0.1 KAU/I
APPLE IGE QN: 1.39 KU/L
B-LACTOGLOB IGE QN: <0.1 KAU/I
BERMUDA GRASS IGE QN: 6.39 KUA/I
BOXELDER IGE QN: 8.23 KUA/I
C HERBARUM IGE QN: <0.1 KUA/I
CASEIN IGE QN: <0.1 KAU/I
CAT DANDER IGE QN: <0.1 KUA/I
CHERRY IGE QN: 4.82 KU/L
CMN PIGWEED IGE QN: 0.23 KUA/I
COMMON RAGWEED IGE QN: 41.3 KUA/I
COTTONWOOD IGE QN: 0.52 KUA/I
CUCUMBER IGE QN: 0.36 KU/L
D FARINAE IGE QN: 0.47 KUA/I
D PTERONYSS IGE QN: 0.55 KUA/I
DOG DANDER IGE QN: <0.1 KUA/I
HAZELNUT IGE QN: 8.7 KUA/L
KIWIFRUIT IGE QN: 0.36 KU/L
LONDON PLANE IGE QN: 0.52 KUA/I
MILK IGE QN: <0.1 KUA/I
MISCELLANEOUS LAB TEST RESULT: NORMAL
MOUSE URINE PROT IGE QN: <0.1 KUA/I
MT JUNIPER IGE QN: 0.81 KUA/I
MUGWORT IGE QN: 2.95 KUA/I
P NOTATUM IGE QN: <0.1 KUA/I
PEACH IGE QN: 10.6 KU/L
PECAN/HICK NUT IGE QN: <0.1 KUA/I
PINEAPPLE IGE QN: 0.12 KU/L
ROACH IGE QN: 0.77 KUA/I
SHEEP SORREL IGE QN: 0.22 KUA/I
SILVER BIRCH IGE QN: 14.2 KUA/I
TIMOTHY IGE QN: 13.7 KUA/I
TOTAL IGE SMQN RAST: 321 KU/L (ref 0–113)
WALNUT IGE QN: 0.29 KUA/I
WALNUT IGE QN: 1.07 KUA/I
WHITE ASH IGE QN: 1.53 KUA/I
WHITE ELM IGE QN: 0.83 KUA/I
WHITE MULBERRY IGE QN: 9.48 KUA/I
WHITE OAK IGE QN: 20.4 KUA/I

## 2023-11-30 PROCEDURE — 71046 X-RAY EXAM CHEST 2 VIEWS: CPT

## 2023-11-30 PROCEDURE — 99283 EMERGENCY DEPT VISIT LOW MDM: CPT

## 2023-11-30 PROCEDURE — 99284 EMERGENCY DEPT VISIT MOD MDM: CPT | Performed by: PHYSICIAN ASSISTANT

## 2023-11-30 RX ORDER — ALBUTEROL SULFATE 90 UG/1
1-2 AEROSOL, METERED RESPIRATORY (INHALATION) EVERY 6 HOURS PRN
Qty: 8 G | Refills: 0 | Status: SHIPPED | OUTPATIENT
Start: 2023-11-30 | End: 2023-11-30 | Stop reason: SDUPTHER

## 2023-11-30 RX ORDER — BENZONATATE 100 MG/1
100 CAPSULE ORAL 3 TIMES DAILY PRN
Qty: 21 CAPSULE | Refills: 0 | Status: SHIPPED | OUTPATIENT
Start: 2023-11-30

## 2023-11-30 RX ORDER — ALBUTEROL SULFATE 90 UG/1
1-2 AEROSOL, METERED RESPIRATORY (INHALATION) EVERY 6 HOURS PRN
Qty: 8 G | Refills: 0 | Status: SHIPPED | OUTPATIENT
Start: 2023-11-30

## 2023-11-30 RX ORDER — BENZONATATE 100 MG/1
100 CAPSULE ORAL 3 TIMES DAILY PRN
Qty: 21 CAPSULE | Refills: 0 | Status: SHIPPED | OUTPATIENT
Start: 2023-11-30 | End: 2023-11-30 | Stop reason: SDUPTHER

## 2023-11-30 NOTE — DISCHARGE INSTRUCTIONS
Please refer to the attached information for strict return instructions. If symptoms worsen or new symptoms develop please return to the ER.  Please follow up with your primary care physician should symptoms persist.

## 2023-11-30 NOTE — Clinical Note
Maninder Harvey was seen and treated in our emergency department on 11/30/2023. Diagnosis:     Ananth  . He may return on this date: 12/04/2023         If you have any questions or concerns, please don't hesitate to call.       Erick Fung PA-C    ______________________________           _______________          _______________  Hospital Representative                              Date                                Time

## 2023-12-01 DIAGNOSIS — Z88.9 MULTIPLE ALLERGIES: Primary | ICD-10-CM

## 2023-12-01 RX ORDER — EPINEPHRINE 0.3 MG/.3ML
0.3 INJECTION SUBCUTANEOUS ONCE
Qty: 0.6 ML | Refills: 0 | Status: SHIPPED | OUTPATIENT
Start: 2023-12-01 | End: 2023-12-01 | Stop reason: SDUPTHER

## 2023-12-01 RX ORDER — EPINEPHRINE 0.3 MG/.3ML
0.3 INJECTION SUBCUTANEOUS ONCE
Qty: 0.6 ML | Refills: 0 | Status: SHIPPED | OUTPATIENT
Start: 2023-12-01 | End: 2023-12-01

## 2023-12-01 NOTE — ED PROVIDER NOTES
History  Chief Complaint   Patient presents with    Cough     Pt with dry cough x 10 days. Pt using inhaler w/ no relief. Pt reports cough worse when laying down     Lisa Shaw is a 27 yo M presenting with 10 days of cough. He reports the cough has been increasing since onset and more productive of sputum. Reports taking previously prescribed albuterol for prior history of asthma without improvement of cough. Denies significant chest pain or dyspnea. No fevers/chills. No known sick contacts/recent travel. Using OTC cough/cold medications w/o significant improvement. History provided by:  Patient   used: No        Prior to Admission Medications   Prescriptions Last Dose Informant Patient Reported? Taking?   famotidine (PEPCID) 20 mg tablet  Self No No   Sig: Take 1 tablet (20 mg total) by mouth daily   levocetirizine (XYZAL) 5 MG tablet  Self No No   Sig: Take 1 tablet (5 mg total) by mouth every evening      Facility-Administered Medications: None       Past Medical History:   Diagnosis Date    Asthma     Impetigo 10/29/2020    Other fatigue 6/9/2021       Past Surgical History:   Procedure Laterality Date    OTHER SURGICAL HISTORY  11/2014    CHIN/CHEEK SURGERY       Family History   Problem Relation Age of Onset    Depression Mother     Hyperlipidemia Father     Hypertension Father     Allergies Brother         Cefalosporin    Asthma Maternal Grandmother      I have reviewed and agree with the history as documented. E-Cigarette/Vaping    E-Cigarette Use Never User      E-Cigarette/Vaping Substances    Nicotine No     THC No     CBD No     Flavoring No     Other No     Unknown No      Social History     Tobacco Use    Smoking status: Never    Smokeless tobacco: Never   Vaping Use    Vaping Use: Never used   Substance Use Topics    Alcohol use: Yes     Comment: social    Drug use: No       Review of Systems   Constitutional:  Negative for chills and fever.    HENT:  Negative for congestion, rhinorrhea and sore throat. Eyes:  Negative for pain and visual disturbance. Respiratory:  Positive for cough. Negative for chest tightness, shortness of breath and wheezing. Cardiovascular:  Negative for chest pain and palpitations. Gastrointestinal:  Negative for abdominal pain, nausea and vomiting. Genitourinary:  Negative for dysuria, frequency and urgency. Musculoskeletal:  Negative for back pain, neck pain and neck stiffness. Skin:  Negative for rash and wound. Neurological:  Negative for dizziness, weakness, light-headedness and numbness. Physical Exam  Physical Exam  Constitutional:       General: He is not in acute distress. Appearance: He is well-developed. He is not diaphoretic. HENT:      Head: Normocephalic and atraumatic. Right Ear: External ear normal.      Left Ear: External ear normal.   Eyes:      Extraocular Movements:      Right eye: Normal extraocular motion. Left eye: Normal extraocular motion. Conjunctiva/sclera: Conjunctivae normal.      Pupils: Pupils are equal, round, and reactive to light. Cardiovascular:      Rate and Rhythm: Normal rate and regular rhythm. Pulmonary:      Effort: Pulmonary effort is normal. No accessory muscle usage or respiratory distress. Breath sounds: No wheezing, rhonchi or rales. Abdominal:      General: Abdomen is flat. There is no distension. Musculoskeletal:      Cervical back: Normal range of motion. No rigidity. Skin:     General: Skin is warm and dry. Capillary Refill: Capillary refill takes less than 2 seconds. Findings: No erythema or rash. Neurological:      Mental Status: He is alert and oriented to person, place, and time. Motor: No abnormal muscle tone. Coordination: Coordination normal.   Psychiatric:         Behavior: Behavior normal.         Thought Content:  Thought content normal.         Judgment: Judgment normal.         Vital Signs  ED Triage Vitals [11/30/23 1649] Temperature Pulse Respirations Blood Pressure SpO2   98.1 °F (36.7 °C) 81 19 133/65 100 %      Temp Source Heart Rate Source Patient Position - Orthostatic VS BP Location FiO2 (%)   Oral Monitor Sitting Right arm --      Pain Score       --           Vitals:    11/30/23 1649   BP: 133/65   Pulse: 81   Patient Position - Orthostatic VS: Sitting         Visual Acuity      ED Medications  Medications - No data to display    Diagnostic Studies  Results Reviewed       None                   XR chest 2 views   ED Interpretation by Prasanna Gar PA-C (11/30 1810)   No acute cardiopulmonary disease on my initial chest x-ray read        Final Result by Kailash Estes MD (11/30 1816)      No acute cardiopulmonary disease. Workstation performed: [id number]                    Procedures  Procedures         ED Course                               SBIRT 20yo+      Flowsheet Row Most Recent Value   Initial Alcohol Screen: US AUDIT-C     1. How often do you have a drink containing alcohol? 0 Filed at: 11/30/2023 1714   2. How many drinks containing alcohol do you have on a typical day you are drinking? 0 Filed at: 11/30/2023 1714   3a. Male UNDER 65: How often do you have five or more drinks on one occasion? 0 Filed at: 11/30/2023 1714   Audit-C Score 0 Filed at: 11/30/2023 1714   ALETA: How many times in the past year have you. .. Used an illegal drug or used a prescription medication for non-medical reasons? Never Filed at: 11/30/2023 1714                      Medical Decision Making  10 days of cough, increasing in frequency and productiveness per pt. On exam he is afebrile, no distress. Lungs CTAB. XR of chest without focal infiltrate on my initial xray read. Suspect viral bronchitis. Will d/c with supportive care, refill of previously prescribed albuterol PRN wheezing although none noted at this time. Follow up and return to ED indications reviewed.      Amount and/or Complexity of Data Reviewed  Radiology: ordered and independent interpretation performed. Risk  OTC drugs. Prescription drug management. Disposition  Final diagnoses:   Bronchitis     Time reflects when diagnosis was documented in both MDM as applicable and the Disposition within this note       Time User Action Codes Description Comment    11/30/2023  6:44 PM Danii Chavez Add [J40] Bronchitis           ED Disposition       ED Disposition   Discharge    Condition   Stable    Date/Time   Thu Nov 30, 2023 [id number] discharge to home/self care. Follow-up Information       Follow up With Specialties Details Why Contact Info Additional Information    [address] [facility] Emergency Department Emergency Medicine  If symptoms worsen [address] [address]  [address] [facility], [facility] Nathan Collier MD Family Medicine Schedule an appointment as soon as possible for a visit   [id number] [facility].   [address] [address] [place]               Discharge Medication List as of 11/30/2023  6:45 PM        START taking these medications    Details   albuterol (Proventil HFA) 90 mcg/act inhaler Inhale 1-2 puffs every 6 (six) hours as needed for wheezing or shortness of breath, Starting Thu 11/30/2023, Normal      benzonatate (TESSALON PERLES) 100 mg capsule Take 1 capsule (100 mg total) by mouth 3 (three) times a day as needed for cough, Starting Thu 11/30/2023, Normal      dextromethorphan-guaifenesin (MUCINEX DM)  MG per 12 hr tablet Take 1 tablet by mouth every 12 (twelve) hours as needed (Congestion/cough), Starting Thu 11/30/2023, Normal           CONTINUE these medications which have NOT CHANGED    Details   famotidine (PEPCID) 20 mg tablet Take 1 tablet (20 mg total) by mouth daily, Starting Fri 6/9/2023, Normal      levocetirizine (XYZAL) 5 MG tablet Take 1 tablet (5 mg total) by mouth every evening, Starting Fri 6/9/2023, Normal             No discharge procedures on file.     PDMP Review       None            ED Provider  Electronically Signed by             Kelsie Rubin PA-C  11/30/23 4814

## 2023-12-02 LAB — APRICOT IGE QN: 2.5 KU/L

## 2023-12-05 ENCOUNTER — HOSPITAL ENCOUNTER (OUTPATIENT)
Dept: NON INVASIVE DIAGNOSTICS | Facility: HOSPITAL | Age: 23
Discharge: HOME | End: 2023-12-05
Payer: COMMERCIAL

## 2023-12-05 ENCOUNTER — TELEPHONE (OUTPATIENT)
Dept: FAMILY MEDICINE CLINIC | Facility: CLINIC | Age: 23
End: 2023-12-05

## 2023-12-05 VITALS
DIASTOLIC BLOOD PRESSURE: 65 MMHG | BODY MASS INDEX: 26.29 KG/M2 | HEART RATE: 81 BPM | SYSTOLIC BLOOD PRESSURE: 133 MMHG | WEIGHT: 154 LBS | HEIGHT: 64 IN

## 2023-12-05 DIAGNOSIS — R55 SYNCOPE, UNSPECIFIED SYNCOPE TYPE: ICD-10-CM

## 2023-12-05 LAB
AORTIC ROOT: 3 CM
APICAL FOUR CHAMBER EJECTION FRACTION: 60 %
ASCENDING AORTA: 2.7 CM
E WAVE DECELERATION TIME: 116 MS
E/A RATIO: 1.61
FRACTIONAL SHORTENING: 37 (ref 28–44)
INTERVENTRICULAR SEPTUM IN DIASTOLE (PARASTERNAL SHORT AXIS VIEW): 0.9 CM
INTERVENTRICULAR SEPTUM: 0.9 CM (ref 0.6–1.1)
LAAS-AP2: 17.3 CM2
LAAS-AP4: 17.4 CM2
LEFT ATRIUM SIZE: 3.7 CM
LEFT ATRIUM VOLUME (MOD BIPLANE): 47 ML
LEFT ATRIUM VOLUME INDEX (MOD BIPLANE): 26.7 ML/M2
LEFT INTERNAL DIMENSION IN SYSTOLE: 2.9 CM (ref 2.1–4)
LEFT VENTRICLE DIASTOLIC VOLUME (MOD BIPLANE): 94 ML
LEFT VENTRICLE SYSTOLIC VOLUME (MOD BIPLANE): 38 ML
LEFT VENTRICULAR INTERNAL DIMENSION IN DIASTOLE: 4.6 CM (ref 3.5–6)
LEFT VENTRICULAR POSTERIOR WALL IN END DIASTOLE: 0.7 CM
LEFT VENTRICULAR STROKE VOLUME: 66 ML
LV EF: 59 %
LVSV (TEICH): 66 ML
MV E'TISSUE VEL-SEP: 15 CM/S
MV PEAK A VEL: 0.49 M/S
MV PEAK E VEL: 79 CM/S
MV STENOSIS PRESSURE HALF TIME: 34 MS
MV VALVE AREA P 1/2 METHOD: 6.47
RIGHT ATRIUM AREA SYSTOLE A4C: 15.8 CM2
RIGHT VENTRICLE ID DIMENSION: 3.8 CM
SL CV LEFT ATRIUM LENGTH A2C: 4.8 CM
SL CV PED ECHO LEFT VENTRICLE DIASTOLIC VOLUME (MOD BIPLANE) 2D: 97 ML
SL CV PED ECHO LEFT VENTRICLE SYSTOLIC VOLUME (MOD BIPLANE) 2D: 31 ML
TR MAX PG: 11 MMHG
TR PEAK VELOCITY: 1.7 M/S
TRICUSPID ANNULAR PLANE SYSTOLIC EXCURSION: 2.4 CM
TRICUSPID VALVE PEAK REGURGITATION VELOCITY: 1.67 M/S

## 2023-12-05 PROCEDURE — 93225 XTRNL ECG REC<48 HRS REC: CPT

## 2023-12-05 PROCEDURE — 93226 XTRNL ECG REC<48 HR SCAN A/R: CPT

## 2023-12-05 PROCEDURE — 93306 TTE W/DOPPLER COMPLETE: CPT

## 2023-12-05 PROCEDURE — 93306 TTE W/DOPPLER COMPLETE: CPT | Performed by: INTERNAL MEDICINE

## 2023-12-05 NOTE — TELEPHONE ENCOUNTER
----- Message from Maral Sanchez MD sent at 12/1/2023 12:58 PM EST -----  Patient blood CBC CMP TSH is normal   But he has multiple food allergy   To send Epipen to patient pharmacy to have it at home   Refer patient to see Allergist

## 2023-12-05 NOTE — RESULT ENCOUNTER NOTE
Patient notified of results and he already sees allergist will forward results in addition sent epi pen

## 2023-12-14 PROCEDURE — 93227 XTRNL ECG REC<48 HR R&I: CPT

## 2023-12-15 ENCOUNTER — TELEPHONE (OUTPATIENT)
Dept: FAMILY MEDICINE CLINIC | Facility: CLINIC | Age: 23
End: 2023-12-15

## 2023-12-15 DIAGNOSIS — I49.1 SUPRAVENTRICULAR BEAT, PREMATURE: Primary | ICD-10-CM

## 2023-12-15 DIAGNOSIS — R55 SYNCOPE, UNSPECIFIED SYNCOPE TYPE: ICD-10-CM

## 2023-12-15 NOTE — RESULT ENCOUNTER NOTE
Patient has on Holter monitor Supraventricular ectopic beats and he has symptom Syncope recommend to see Cardiology

## 2023-12-15 NOTE — TELEPHONE ENCOUNTER
----- Message from Surinder Moses MD sent at 12/15/2023 10:00 AM EST -----  Patient has on Holter monitor Supraventricular ectopic beats and he has symptom Syncope recommend to see Cardiology

## 2023-12-20 ENCOUNTER — TELEPHONE (OUTPATIENT)
Dept: CARDIOLOGY CLINIC | Facility: CLINIC | Age: 23
End: 2023-12-20

## 2023-12-20 ENCOUNTER — HOSPITAL ENCOUNTER (OUTPATIENT)
Dept: MRI IMAGING | Facility: HOSPITAL | Age: 23
Discharge: HOME | End: 2023-12-20
Payer: COMMERCIAL

## 2023-12-20 DIAGNOSIS — R55 SYNCOPE, UNSPECIFIED SYNCOPE TYPE: ICD-10-CM

## 2023-12-20 PROCEDURE — 70551 MRI BRAIN STEM W/O DYE: CPT

## 2023-12-20 PROCEDURE — G1004 CDSM NDSC: HCPCS

## 2023-12-20 NOTE — TELEPHONE ENCOUNTER
I recvd a call from pts father regarding an appt for his sone,I advised we would get back to someone shortly.Spoke to Dr hung who read tests ok to schedule 4-6 week appt Dr Hung also tried to reach pt.Called son explained conversation with Dr hung and that ok for 4-6 week appt scheduled with pt Did not give any medical info to father

## 2023-12-21 ENCOUNTER — TELEPHONE (OUTPATIENT)
Dept: FAMILY MEDICINE CLINIC | Facility: CLINIC | Age: 23
End: 2023-12-21

## 2023-12-21 LAB — MISCELLANEOUS LAB TEST RESULT: NORMAL

## 2023-12-21 NOTE — TELEPHONE ENCOUNTER
Patient wondering if we are able to put in a stat referral to cardiology as he keeps passing out. His appointment is scheduled for Feb but they were wondering what they could do to get a January appt.

## 2023-12-26 DIAGNOSIS — J30.1 SEASONAL ALLERGIC RHINITIS DUE TO POLLEN: ICD-10-CM

## 2023-12-26 RX ORDER — FAMOTIDINE 20 MG/1
20 TABLET, FILM COATED ORAL DAILY
Qty: 90 TABLET | Refills: 0 | Status: SHIPPED | OUTPATIENT
Start: 2023-12-26

## 2023-12-26 RX ORDER — LEVOCETIRIZINE DIHYDROCHLORIDE 5 MG/1
5 TABLET, FILM COATED ORAL EVERY EVENING
Qty: 90 TABLET | Refills: 0 | Status: SHIPPED | OUTPATIENT
Start: 2023-12-26

## 2023-12-26 NOTE — TELEPHONE ENCOUNTER
Cardiology got back to me. They will be putting patient on a wait list for the other doctors. Cardiology is not able to speak with father due to HIPAA privacy. Patient is able to tell his father if he would like. Dr. HENDRICKSON is not here for the month of January so February is the earliest appointment.

## 2024-02-07 ENCOUNTER — CONSULT (OUTPATIENT)
Dept: CARDIOLOGY CLINIC | Facility: CLINIC | Age: 24
End: 2024-02-07
Payer: COMMERCIAL

## 2024-02-07 ENCOUNTER — TELEPHONE (OUTPATIENT)
Age: 24
End: 2024-02-07

## 2024-02-07 VITALS
HEIGHT: 64 IN | HEART RATE: 60 BPM | BODY MASS INDEX: 26.53 KG/M2 | SYSTOLIC BLOOD PRESSURE: 120 MMHG | WEIGHT: 155.4 LBS | DIASTOLIC BLOOD PRESSURE: 80 MMHG

## 2024-02-07 DIAGNOSIS — I49.1 SUPRAVENTRICULAR BEAT, PREMATURE: ICD-10-CM

## 2024-02-07 DIAGNOSIS — R55 SYNCOPE, UNSPECIFIED SYNCOPE TYPE: ICD-10-CM

## 2024-02-07 PROCEDURE — 93000 ELECTROCARDIOGRAM COMPLETE: CPT | Performed by: INTERNAL MEDICINE

## 2024-02-07 PROCEDURE — 99244 OFF/OP CNSLTJ NEW/EST MOD 40: CPT | Performed by: INTERNAL MEDICINE

## 2024-02-07 NOTE — TELEPHONE ENCOUNTER
"Pt father and son called to discuss cardiology appt  2/7 they just came from.  Stated Cardiology saw \"few extra beats\", but nothing to worry about.     Pt and father are concerned that they do not have any answers of why he passed out.  They would like to discuss options with Dr Smith.  Does she feel they need to see another type of specialist or is she satisfied with what the Cardiologist said. They would like to know what the next step is.    Pt is willing to schedule an appt if needed to discuss versus a phone conversation.    Please call home number [phone]   "

## 2024-02-07 NOTE — PROGRESS NOTES
[facility]  [address] [place] [address]  Phone#  [phone]. Fax#  [phone]  *-*-*-*-*-*-*-*-*-*-*-*-*-*-*-*-*-*-*-*-*-*-*-*-*-*-*-*-*-*-*-*-*-*-*-*-*-*-*-*-*-*-*-*-*-*-*-*-*-*-*-*-*-*  ENCOUNTER DATE: 02/07/24 10:42 AM  PATIENT NAME: Ananth Dailey   2000    [id number]  Age: 23 y.o.      Sex: male  AUTHOR: Winsome Hung MD  PRIMARYCARE PHYSICIAN: Esther Smith MD  REFERRING PHYSICIAN: Esther Smith MD  [address] [place] [address].  [address]  [place] [address] Esther Smith MD   *-*-*-*-*-*-*-*-*-*-*-*-*-*-*-*-*-*-*-*-*-*-*-*-*-*-*-*-*-*-*-*-*-*-*-*-*-*-*-*-*-*-*-*-*-*-*-*-*-*-*-*-*-*-  REASON FOR REFERRAL: Syncope, premature atrial contractions    *-*-*-*-*-*-*-*-*-*-*-*-*-*-*-*-*-*-*-*-*-*-*-*-*-*-*-*-*-*-*-*-*-*-*-*-*-*-*-*-*-*-*-*-*-*-*-*-*-*-*-*-*-*-  CARDIOLOGY ASSESSMENT & PLAN:   Diagnosis ICD-10-CM Associated Orders   1. Syncope, unspecified syncope type  R55 Ambulatory Referral to Cardiology     POCT ECG      2. Supraventricular beat, premature  I49.1 Ambulatory Referral to Cardiology     POCT ECG        Syncope  Mr. Ananth Dailey is a 23-year-old young man who had a syncopal event a couple of months back.  Description of symptoms suggest possible vasovagal syncope although we cannot be sure.  He has had extensive testing including a Holter monitor and echocardiogram which are overall unremarkable.  His physical examination is unremarkable.  Blood work from now medically stable electrolytes and renal function and normal thyroid function.  His ECG today is normal.      He has no high risk features that suggest arrhythmia related syncope.  His exercise tolerance is good.    -- At this time I am advising him to continue normal activities.  No further workup is needed at this time.  -- I am advising him to have follow-up blood work in 1 year time with his primary care physician including a thyroid function test.  -- He does not have to follow-up with cardiologist on regular  basis unless he has another syncopal event.  In that case he is advised to reach out to us we will consider an extended Holter monitor and further evaluation.  -- I am encouraging him to staying well-hydrated at all times and to avoid sudden turning bending down or getting up from lying down or sitting position.  -- I am advising him that if he is going out for a long drive or going to be standing for long time then he should wear tight socks going up to the knee.  -- There is no restriction to physical activity or sport as long as he is Tolerated.      *-*-*-*-*-*-*-*-*-*-*-*-*-*-*-*-*-*-*-*-*-*-*-*-*-*-*-*-*-*-*-*-*-*-*-*-*-*-*-*-*-*-*-*-*-*-*-*-*-*-*-*-*-*-  CURRENT ECG:  Results for orders placed or performed in visit on 02/07/24   POCT ECG    Narrative    Normal sinus rhythm, HR 60 bpm, normal axis and intervals, no significant chamber hypertrophy or enlargement, no evidence of infarction, no ischemia.     *-*-*-*-*-*-*-*-*-*-*-*-*-*-*-*-*-*-*-*-*-*-*-*-*-*-*-*-*-*-*-*-*-*-*-*-*-*-*-*-*-*-*-*-*-*-*-*-*-*-*-*-*-*-  HISTORY OF PRESENT ILLNESS:  Patient is a 23-year-old young man with no major medical history except for mild intermittent  asthma and allergic rhinitis.  He is here because he is experienced an episode of syncope a couple of months back.  He mentions that he had a normal day and he was sitting on a couch and got up and walked about 10 feet and stretch himself when he suddenly felt lightheaded and passed out finding himself on the floor.  Prior to that he hit his head on the low side of the fridge.  When he woke up he did not have any residual symptoms including no feeling of palpitations.  He denies feeling diaphoretic or having clammy or had any other symptoms.  Since that time he has had no recurrence of similar symptoms.  He mentions that in the past he occasionally passed out when he was in severe pain and this episode was similar.  He denies unusual chest pains or shortness of breath with normal  activities.  He denies any palpitations recently.  Denies any headaches or visual changes.    Though he does not exercise regularly he is fairly active.  He has never been a smoker.  He drinks alcohol maybe once a week and occasionally.  Denies any use of marijuana or any other illicit drug use.    He reports being active during his adolescence and played volleyball for school.  He had no syncopal events relating to sport activities or exertion.    He has a strong family history of coronary artery disease with his father being diagnosed with CAD in his 50s and undergoing bypass surgery.    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    Current cardiac meds: He is not on cardiac specific medications.    Previous blood work:   Last blood work is from 11/27/2023.  Sodium was 137 potassium 3.9 chloride 100 bicarb 28 BUN 11 creatinine 1.0   Total cholesterol 171 triglyceride 84 HDL 65 calculated LDL 89  TSH 0.904    Previous cardiac studies:   48-HOUR HOLTER MONITOR December 2023:  Holter monitor reveals the underlying rhythm is sinus rhythm with an average heart rate of 80 beats per minute, a minimum heart rate of 50 beats per minute and a maximum heart rate of 160 beats per minute.  There were about 0 ventricular ectopic beats, with no evidence of ventricular tachycardia.  There were about 327 supraventricular ectopic beats, mostly occurring as single PAC's and late beats with no evidence of sustained supraventricular tachycardia, atrial fibrillation, or atrial flutter.  There is no evidence of significant bradyarrhythmia or advanced heart block.  The longest R to R was 1.5 seconds.  The patient's symptom diary reported no symptoms.     ECHOCARDIOGRAM 12/5/2023:  Normal left ventricular size and systolic and diastolic function.  Left ventricular ejection fraction is estimated as around 60%.  Normal right ventricle size and systolic function.  Normal left and right atrial  cavity size.  Normal aortic valve, no aortic valve stenosis or regurgitation.  Normal mitral valve, trace mitral valve regurgitation.  Trace tricuspid valve regurgitation.  No obvious pulmonary hypertension.  No pericardial effusion.     *-*-*-*-*-*-*-*-*-*-*-*-*-*-*-*-*-*-*-*-*-*-*-*-*-*-*-*-*-*-*-*-*-*-*-*-*-*-*-*-*-*-*-*-*-*-*-*-*-*-*-*-*-*  PAST MEDICAL HISTORY:  Past Medical History:   Diagnosis Date    Asthma     Impetigo 10/29/2020    Other fatigue 6/9/2021    PAST SURGICAL HISTORY:   Past Surgical History:   Procedure Laterality Date    OTHER SURGICAL HISTORY  11/2014    CHIN/CHEEK SURGERY         FAMILY HISTORY:  Family History   Problem Relation Age of Onset    Depression Mother     Hyperlipidemia Father     Hypertension Father     Allergies Brother         Cefalosporin    Asthma Maternal Grandmother     SOCIAL HISTORY:  Social History     Tobacco Use   Smoking Status Never   Smokeless Tobacco Never      Social History     Substance and Sexual Activity   Alcohol Use Yes    Comment: social     Social History     Substance and Sexual Activity   Drug Use No    [unfilled]     *-*-*-*-*-*-*-*-*-*-*-*-*-*-*-*-*-*-*-*-*-*-*-*-*-*-*-*-*-*-*-*-*-*-*-*-*-*-*-*-*-*-*-*-*-*-*-*-*-*-*-*-*-*  ALLERGIES:  Allergies   Allergen Reactions    Pollen Extract Swelling     Pt states pollen from fruits causes inflammation    CURRENT SCHEDULED MEDICATIONS:    Current Outpatient Medications:     albuterol (Proventil HFA) 90 mcg/act inhaler, Inhale 1-2 puffs every 6 (six) hours as needed for wheezing or shortness of breath, Disp: 8 g, Rfl: 0    benzonatate (TESSALON PERLES) 100 mg capsule, Take 1 capsule (100 mg total) by mouth 3 (three) times a day as needed for cough, Disp: 21 capsule, Rfl: 0    dextromethorphan-guaifenesin (MUCINEX DM)  MG per 12 hr tablet, Take 1 tablet by mouth every 12 (twelve) hours as needed (Congestion/cough), Disp: 14 tablet, Rfl: 0    famotidine (PEPCID) 20 mg tablet, TAKE 1 TABLET BY MOUTH EVERY DAY,  Disp: 90 tablet, Rfl: 0    levocetirizine (XYZAL) 5 MG tablet, TAKE 1 TABLET BY MOUTH EVERY DAY IN THE EVENING, Disp: 90 tablet, Rfl: 0    EPINEPHrine (EPIPEN) 0.3 mg/0.3 mL SOAJ, Inject 0.3 mL (0.3 mg total) into a muscle once for 1 dose, Disp: 0.6 mL, Rfl: 0     *-*-*-*-*-*-*-*-*-*-*-*-*-*-*-*-*-*-*-*-*-*-*-*-*-*-*-*-*-*-*-*-*-*-*-*-*-*-*-*-*-*-*-*-*-*-*-*-*-*-*-*-*-*  REVIEW OF SYMPTOMS:    Positive for: As noted above in HPI  Negative for: All remaining as reviewed below and in HPI. SYSTEM SYMPTOMS REVIEWED:  General--weight change, fever, night sweats  Respiratoryl-- Wheezing, shortness of breath, cough, URI symptoms, sputum, blood  Cardiovascular--chest pain, syncope, dyspnea on exertion, edema, decline in exercise tolerance, claudication   Gastrointestinal--persistent vomiting, diarrhea, abdominal distention, blood in stool   Muscular or skeletal--joint pain or swelling   Neurologic--headaches, syncope, abnormal movement  Hematologic--history of easy bruising and bleeding   Endocrine--thyroid enlargement, heat or cold intolerance, polyuria   Psychiatric--anxiety, depression      *-*-*-*-*-*-*-*-*-*-*-*-*-*-*-*-*-*-*-*-*-*-*-*-*-*-*-*-*-*-*-*-*-*-*-*-*-*-*-*-*-*-*-*-*-*-*-*-*-*-*-*-*-*  CURRENT OUTPATIENT MEDICATIONS:     Current Outpatient Medications:     albuterol (Proventil HFA) 90 mcg/act inhaler, Inhale 1-2 puffs every 6 (six) hours as needed for wheezing or shortness of breath, Disp: 8 g, Rfl: 0    benzonatate (TESSALON PERLES) 100 mg capsule, Take 1 capsule (100 mg total) by mouth 3 (three) times a day as needed for cough, Disp: 21 capsule, Rfl: 0    dextromethorphan-guaifenesin (MUCINEX DM)  MG per 12 hr tablet, Take 1 tablet by mouth every 12 (twelve) hours as needed (Congestion/cough), Disp: 14 tablet, Rfl: 0    famotidine (PEPCID) 20 mg tablet, TAKE 1 TABLET BY MOUTH EVERY DAY, Disp: 90 tablet, Rfl: 0    levocetirizine (XYZAL) 5 MG tablet, TAKE 1 TABLET BY MOUTH EVERY DAY IN THE EVENING,  "Disp: 90 tablet, Rfl: 0    EPINEPHrine (EPIPEN) 0.3 mg/0.3 mL SOAJ, Inject 0.3 mL (0.3 mg total) into a muscle once for 1 dose, Disp: 0.6 mL, Rfl: 0    *-*-*-*-*-*-*-*-*-*-*-*-*-*-*-*-*-*-*-*-*-*-*-*-*-*-*-*-*-*-*-*-*-*-*-*-*-*-*-*-*-*-*-*-*-*-*-*-*-*-*-*-*-*  VITAL SIGNS:  Vitals:    02/07/24 1007   BP: 120/80   BP Location: Right arm   Patient Position: Sitting   Cuff Size: Adult   Pulse: 60   Weight: 70.5 kg (155 lb 6.4 oz)   Height: 5' 4\" (1.626 m)       BMI: Body mass index is 26.67 kg/m².    WEIGHTS:   Wt Readings from Last 25 Encounters:   02/07/24 70.5 kg (155 lb 6.4 oz)   12/05/23 69.9 kg (154 lb)   11/30/23 69.9 kg (154 lb 1.6 oz)   11/17/23 69.9 kg (154 lb)   09/20/23 70.9 kg (156 lb 6.4 oz)   06/09/23 70.3 kg (155 lb)   04/29/22 71.7 kg (158 lb)   06/09/21 70.7 kg (155 lb 12.8 oz)   04/23/21 70.8 kg (156 lb)   10/29/20 68.9 kg (152 lb) (46%, Z= -0.10)*   01/15/19 65.8 kg (145 lb) (46%, Z= -0.09)*   07/21/18 64.5 kg (142 lb 3.2 oz) (46%, Z= -0.09)*     * Growth percentiles are based on [facility] (Boys, 2-20 Years) data.        *-*-*-*-*-*-*-*-*-*-*-*-*-*-*-*-*-*-*-*-*-*-*-*-*-*-*-*-*-*-*-*-*-*-*-*-*-*-*-*-*-*-*-*-*-*-*-*-*-*-*-*-*-*-  PHYSICAL EXAM:  General Appearance:    Alert, cooperative, no distress, appears stated age   Head, Eyes, ENT:    No obvious abnormality, moist mucous mebranes.   Neck:   Supple, no carotid bruit or JVD   Back:     Symmetric, no curvature.   Lungs:     Respirations unlabored. Clear to auscultation bilaterally,    Chest wall:    No tenderness or deformity   Heart:    Regular rate and rhythm, S1 and S2 normal, no murmur, rub  or gallop.   Abdomen:     Soft, non-tender, No obvious masses, or organomegaly   Extremities:   Extremities warm, no cyanosis or edema    Skin:   No venostatic changes in lower extremities. Normal skin color, texture, and turgor. No rashes or lesions   " "  *-*-*-*-*-*-*-*-*-*-*-*-*-*-*-*-*-*-*-*-*-*-*-*-*-*-*-*-*-*-*-*-*-*-*-*-*-*-*-*-*-*-*-*-*-*-*-*-*-*-*-*-*-*-  LABORATORY DATA: I have personally reviewed the available laboratory data.        Potassium   Date Value Ref Range Status   11/27/2023 3.9 3.5 - 5.3 mmol/L Final   07/24/2021 4.0 3.6 - 5.0 mmol/L Final     Chloride   Date Value Ref Range Status   11/27/2023 100 96 - 108 mmol/L Final   07/24/2021 101 97 - 108 mmol/L Final     CO2   Date Value Ref Range Status   11/27/2023 28 21 - 32 mmol/L Final   07/24/2021 29 22 - 30 mmol/L Final     BUN   Date Value Ref Range Status   11/27/2023 11 5 - 25 mg/dL Final   07/24/2021 14 5 - 25 mg/dL Final     Creatinine   Date Value Ref Range Status   11/27/2023 1.00 0.60 - 1.30 mg/dL Final     Comment:     Standardized to IDMS reference method   07/24/2021 0.86 0.70 - 1.50 mg/dL Final     Comment:     Standardized to IDMS reference method     eGFR   Date Value Ref Range Status   11/27/2023 106 ml/min/1.73sq m Final   07/24/2021 125 >60 ml/min/1.73sq m Final     Calcium   Date Value Ref Range Status   11/27/2023 10.2 8.4 - 10.2 mg/dL Final   07/24/2021 9.6 8.4 - 10.2 mg/dL Final     WBC   Date Value Ref Range Status   11/27/2023 6.77 4.31 - 10.16 Thousand/uL Final   07/24/2021 5.00 4.50 - 11.00 Thousand/uL Final     Hemoglobin   Date Value Ref Range Status   11/27/2023 15.3 12.0 - 17.0 g/dL Final   07/24/2021 14.6 13.5 - 17.5 g/dL Final     Platelets   Date Value Ref Range Status   11/27/2023 245 149 - 390 Thousands/uL Final   07/24/2021 194 150 - 450 Thousands/uL Final     No results found for: \"PT\", \"PTT\", \"INR\"  No results found for: \"CKMB\", \"DIGOXIN\"  No results found for: \"TSH\"  HDL, Direct   Date Value Ref Range Status   11/27/2023 65 >=40 mg/dL Final     Triglycerides   Date Value Ref Range Status   11/27/2023 84 See Comment mg/dL Final     Comment:     Triglyceride:     0-9Y            <75mg/dL     10Y-17Y         <90 mg/dL       >=18Y     Normal          <150 " "mg/dL     Borderline High 150-199 mg/dL     High            200-499 mg/dL        Very High       >499 mg/dL    Specimen collection should occur prior to Metamizole administration due to the potential for falsely depressed results.      No results found for: \"HGBA1C\"  No results found for: \"BLOODCX\", \"SPUTUMCULTUR\", \"GRAMSTAIN\", \"URINECX\", \"WOUNDCULT\", \"BODYFLUIDCUL\", \"MRSACULTURE\", \"INFLUAPCR\", \"INFLUBPCR\", \"RSVPCR\", \"LEGIONELLAUR\", \"CDIFFTOXINB\"    *-*-*-*-*-*-*-*-*-*-*-*-*-*-*-*-*-*-*-*-*-*-*-*-*-*-*-*-*-*-*-*-*-*-*-*-*-*-*-*-*-*-*-*-*-*-*-*-*-*-*-*-*-*-  RADIOLOGY RESULTS:  MRI brain wo contrast    Result Date: 12/27/2023  Impression: Normal MRI of the brain. Workstation performed: [id number]       *-*-*-*-*-*-*-*-*-*-*-*-*-*-*-*-*-*-*-*-*-*-*-*-*-*-*-*-*-*-*-*-*-*-*-*-*-*-*-*-*-*-*-*-*-*-*-*-*-*-*-*-*-*-  LAST ECHOCARDIOGRAM AND OTHER CARDIOLOGY RESULTS:  No results found for this or any previous visit.    No results found for this or any previous visit.    No results found for this or any previous visit.    No results found for this or any previous visit.       *-*-*-*-*-*-*-*-*-*-*-*-*-*-*-*-*-*-*-*-*-*-*-*-*-*-*-*-*-*-*-*-*-*-*-*-*-*-*-*-*-*-*-*-*-*-*-*-*-*-*-*-*-*-  SIGNATURES:   @SIG@   Winsome Hung MD     CC:   MD Luis Garrido Maha, MD     "

## 2024-02-07 NOTE — PATIENT INSTRUCTIONS
CARDIOLOGY ASSESSMENT & PLAN:   Diagnosis ICD-10-CM Associated Orders   1. Syncope, unspecified syncope type  R55 Ambulatory Referral to Cardiology     POCT ECG      2. Supraventricular beat, premature  I49.1 Ambulatory Referral to Cardiology     POCT ECG        Syncope  Mr. Ananth Dailey is a 23-year-old young man who had a syncopal event a couple of months back.  Description of symptoms suggest possible vasovagal syncope although we cannot be sure.  He has had extensive testing including a Holter monitor and echocardiogram which are overall unremarkable.  His physical examination is unremarkable.  Blood work from now medically stable electrolytes and renal function and normal thyroid function.  His ECG today is normal.      He has no high risk features that suggest arrhythmia related syncope.  His exercise tolerance is good.    -- At this time I am advising him to continue normal activities.  No further workup is needed at this time.  -- I am advising him to have follow-up blood work in 1 year time with his primary care physician including a thyroid function test.  -- He does not have to follow-up with cardiologist on regular basis unless he has another syncopal event.  In that case he is advised to reach out to us we will consider an extended Holter monitor and further evaluation.  -- I am encouraging him to staying well-hydrated at all times and to avoid sudden turning bending down or getting up from lying down or sitting position.  -- I am advising him that if he is going out for a long drive or going to be standing for long time then he should wear tight socks going up to the knee.  -- There is no restriction to physical activity or sport as long as he is Tolerated.

## 2024-02-07 NOTE — ASSESSMENT & PLAN NOTE
Mr. Ananth Dailey is a 23-year-old young man who had a syncopal event a couple of months back.  Description of symptoms suggest possible vasovagal syncope although we cannot be sure.  He has had extensive testing including a Holter monitor and echocardiogram which are overall unremarkable.  His physical examination is unremarkable.  Blood work from now medically stable electrolytes and renal function and normal thyroid function.  His ECG today is normal.      He has no high risk features that suggest arrhythmia related syncope.  His exercise tolerance is good.    -- At this time I am advising him to continue normal activities.  No further workup is needed at this time.  -- I am advising him to have follow-up blood work in 1 year time with his primary care physician including a thyroid function test.  -- He does not have to follow-up with cardiologist on regular basis unless he has another syncopal event.  In that case he is advised to reach out to us we will consider an extended Holter monitor and further evaluation.  -- I am encouraging him to staying well-hydrated at all times and to avoid sudden turning bending down or getting up from lying down or sitting position.  -- I am advising him that if he is going out for a long drive or going to be standing for long time then he should wear tight socks going up to the knee.  -- There is no restriction to physical activity or sport as long as he is Tolerated.

## 2024-02-09 DIAGNOSIS — R55 SYNCOPE, UNSPECIFIED SYNCOPE TYPE: Primary | ICD-10-CM

## 2024-02-09 NOTE — TELEPHONE ENCOUNTER
I spoke to patient's dad and gave him information for neurology to follow up with them. Father agreed and will give them a call. I let them know Dr Smith reviewed cardiology notes and patient was negative for cardiac reasons for syncope.

## 2024-05-06 ENCOUNTER — TELEPHONE (OUTPATIENT)
Dept: FAMILY MEDICINE CLINIC | Facility: CLINIC | Age: 24
End: 2024-05-06

## 2024-05-06 NOTE — TELEPHONE ENCOUNTER
Left message with patient father to advise that Dr. Smith will be out of the office on 6/14/24 and we need to reschedule the appointment. Patient father states that he will have the patient call back to reschedule.

## 2024-05-29 ENCOUNTER — TELEPHONE (OUTPATIENT)
Age: 24
End: 2024-05-29

## 2024-05-29 NOTE — TELEPHONE ENCOUNTER
"Pt father called in requesting if the \"lactose pills\" can be prescribed and sent to [facility] [place] in [place] on [address]. Please advise.   "

## 2024-05-30 DIAGNOSIS — E73.9 LACTOSE INTOLERANCE: Primary | ICD-10-CM

## 2024-05-30 RX ORDER — CHOLECALCIFEROL (VITAMIN D3) 125 MCG
3000 CAPSULE ORAL
Qty: 90 TABLET | Refills: 0 | Status: SHIPPED | OUTPATIENT
Start: 2024-05-30

## 2024-06-14 ENCOUNTER — CONSULT (OUTPATIENT)
Dept: NEUROLOGY | Facility: CLINIC | Age: 24
End: 2024-06-14
Payer: COMMERCIAL

## 2024-06-14 VITALS
HEART RATE: 74 BPM | DIASTOLIC BLOOD PRESSURE: 70 MMHG | HEIGHT: 64 IN | OXYGEN SATURATION: 98 % | TEMPERATURE: 97.6 F | WEIGHT: 157.6 LBS | BODY MASS INDEX: 26.91 KG/M2 | SYSTOLIC BLOOD PRESSURE: 120 MMHG | RESPIRATION RATE: 14 BRPM

## 2024-06-14 DIAGNOSIS — R55 VASOVAGAL SYNCOPE: Primary | ICD-10-CM

## 2024-06-14 PROCEDURE — 99204 OFFICE O/P NEW MOD 45 MIN: CPT | Performed by: PSYCHIATRY & NEUROLOGY

## 2024-06-14 NOTE — PROGRESS NOTES
Review of Systems   Constitutional:  Negative for appetite change, fatigue and fever.   HENT: Negative.  Negative for hearing loss, tinnitus, trouble swallowing and voice change.    Eyes: Negative.  Negative for photophobia, pain and visual disturbance.   Respiratory: Negative.  Negative for shortness of breath.    Cardiovascular: Negative.  Negative for palpitations.   Gastrointestinal: Negative.  Negative for nausea and vomiting.   Endocrine: Negative.  Negative for cold intolerance.   Genitourinary: Negative.  Negative for dysuria, frequency and urgency.   Musculoskeletal:  Negative for back pain, gait problem, myalgias, neck pain and neck stiffness.   Skin: Negative.  Negative for rash.   Allergic/Immunologic: Negative.    Neurological:  Positive for syncope (recent was 4 months ago). Negative for dizziness, tremors, seizures, facial asymmetry, speech difficulty, weakness, light-headedness, numbness and headaches.   Hematological: Negative.  Does not bruise/bleed easily.   Psychiatric/Behavioral: Negative.  Negative for confusion, hallucinations and sleep disturbance.    All other systems reviewed and are negative.

## 2024-06-14 NOTE — PROGRESS NOTES
[facility]  Initial Consultation    Impression/Plan    Mr. Dailey is a 24 y.o. male with vasovagal syncope. All events were provoked, most by pain. The 11/2023 event was provoked by poor hydration, standing quickly and then stretching. His neurological exam is normal. Normal brain MRI. No epilepsy risk factors. No events concerning for seizure. We discussed the pathophysiology of syncope/epilepsy/seizure and safety/precautions.     Patient Instructions   The event you had last year was syncope.   No additional testing needed at this time.   Stay well hydrated. Use caution when standing up quickly.  Return as needed.      Diagnoses and all orders for this visit:    Vasovagal syncope  -     Ambulatory Referral to Neurology        Subjective    Ananth Dailey is a 24 y.o. male presenting to the [facility] for evaluation of syncope.  He arrives with his father.  History is from the patient, his father and review of the records.  He is in good health.  His history includes mild intermittent asthma and allergic rhinitis.    He passed out in 11/2023. Had been sitting for some time on the couch. Got up relatively quickly and took a few steps and then stretched. Had splits second of feeling like he was going to pass out (lightheaded) and then woke up on floor. Was out for a few seconds. Wasn't hydrating well at the time.  Hit his head on the refrigerator.  Holter showed ectopic beats. Saw Cardiology 2/7/2024.  No chest pain, palpitations, diaphoresis.  His father was diagnosed with coronary artery disease in his 50s and underwent bypass surgery.  MRI brain 12/20/2023 was normal.  Holter monitor and echocardiogram were unremarkable.  Thyroid function normal.  Advised by cardiology to wear tight socks up to the knee if he is going to be standing for a long time or on a long drive.    There have been multiple past events of syncope, all with triggers. Had event  "when getting mole removed. Passed out after getting injection in scalp. Fell off chair. He recalls significant pain with the numbning injections. Maybe 1.5 years ago. Wasn't drinking much water at the time. Had been sitting for an hour or more. Passed out after HPV vaccine. Passed out after bowel movement at 15 yo, had extreme pain prior to passing out.     He is a .  He is currently working summer camp.  He played volleyball when he was in high school.    No evidence of nocturnal seizure.  No staring spells, no behavioral arrest, no symptoms concerning for focal aware seizure arising from the temporal lobe.  No concerning myoclonus.  No new weakness numbness tingling, vision change, balance problems, cognitive difficulties.    Current AEDs:  None    Event/Seizure semiology:  Vasovagal syncope    Special Features  Status epilepticus: no  Self Injury Seizures: no  Precipitating Factors: pain  Age/Date of seizure onset: 14  Longest seizure free interval: years    Epilepsy Risk Factors:  None  Normal birth and early development. No history of febrile seizures. No history of CNS infection. No family history of epilepsy. No significant head injury.     Prior AEDs:  None    Prior Evaluation:  MRI brain normal  No EEG    History Reviewed:   The following were reviewed and updated as appropriate: allergies, current medications, past family history, past medical history, past social history, past surgical history, and problem list     Psychiatric History:  None    Social History:   Driving: Yes  Lives Alone: No  Occupation: Teacher        Objective    /70   Pulse 74   Temp 97.6 °F (36.4 °C) (Temporal)   Resp 14   Ht 5' 4\" (1.626 m)   Wt 71.5 kg (157 lb 9.6 oz)   SpO2 98%   BMI 27.05 kg/m²      General Exam  General: well developed, no acute distress.  HEENT: mucous membranes moist, anicteric sclera.   Neck:  good ROM.  Extremities: no clubbing, cyanosis or edema.   Skin: no rash on " visible skin.    Neurological Exam  Mental Status: awake, alert, and fully oriented to person, place, time, and situation. Attention intact. Fund of knowledge is appropriate for age and education.  There is no neglect.  Language: fluency, naming, comprehension normal.       Cranial Nerves: Pupils equal and reactive to light.  Visual fields full to confrontation. Extraocular motions intact with full versions, normal pursuits and saccades. Facial strength full and symmetric. Facial sensation intact in V1-V3. Hearing intact to finger rub bilaterally. Tongue protrudes to midline. Palate elevates symmetrically. Speech clear without notable dysarthria. Shoulder shrug activation full and symmetric.    Motor: Normal bulk and tone. No pronator drift. Strength is 5/5 proximally and distally in all 4 extremities. No involuntary movements.    Sensory: Sensation intact to light touch distally in all extremities.    Coordination: Normal finger-to-nose.     Station and gait: Casual and tandem gait normal. Normal Romberg.    Reflexes: Reflexes 2+ throughout and symmetric (biceps, patella, achilles).           Artemio Currie MD   [facility]  Diplomate, ABPN Neurology and Epilepsy        I have spent a total time of 55 minutes on 6/14/2024 in caring for this patient including Diagnostic results, Prognosis, Risks and benefits of tx options, Instructions for management, Patient and family education, Risk factor reductions, Impressions, Counseling / Coordination of care, Documenting in the medical record, Reviewing / ordering tests, medicine, procedures  , and Obtaining or reviewing history  .

## 2024-06-14 NOTE — PATIENT INSTRUCTIONS
The event you had last year was syncope.   No additional testing needed at this time.   Stay well hydrated. Use caution when standing up quickly.  Return as needed.

## 2024-11-04 ENCOUNTER — OFFICE VISIT (OUTPATIENT)
Dept: FAMILY MEDICINE CLINIC | Facility: CLINIC | Age: 24
End: 2024-11-04
Payer: COMMERCIAL

## 2024-11-04 VITALS
HEART RATE: 85 BPM | SYSTOLIC BLOOD PRESSURE: 120 MMHG | HEIGHT: 66 IN | TEMPERATURE: 98.8 F | DIASTOLIC BLOOD PRESSURE: 80 MMHG | BODY MASS INDEX: 25.84 KG/M2 | WEIGHT: 160.8 LBS | OXYGEN SATURATION: 99 %

## 2024-11-04 DIAGNOSIS — Z00.01 ENCOUNTER FOR WELL ADULT EXAM WITH ABNORMAL FINDINGS: Primary | ICD-10-CM

## 2024-11-04 DIAGNOSIS — J45.20 MILD INTERMITTENT ASTHMA WITHOUT COMPLICATION: ICD-10-CM

## 2024-11-04 DIAGNOSIS — E66.3 OVERWEIGHT (BMI 25.0-29.9): ICD-10-CM

## 2024-11-04 DIAGNOSIS — L65.9 ALOPECIA: ICD-10-CM

## 2024-11-04 PROCEDURE — 99214 OFFICE O/P EST MOD 30 MIN: CPT | Performed by: FAMILY MEDICINE

## 2024-11-04 PROCEDURE — 99395 PREV VISIT EST AGE 18-39: CPT | Performed by: FAMILY MEDICINE

## 2024-11-04 RX ORDER — FLUTICASONE PROPIONATE 110 UG/1
2 AEROSOL, METERED RESPIRATORY (INHALATION) 2 TIMES DAILY
Qty: 12 G | Refills: 1 | Status: SHIPPED | OUTPATIENT
Start: 2024-11-04

## 2024-11-04 RX ORDER — FINASTERIDE 1 MG/1
1 TABLET, FILM COATED ORAL DAILY
COMMUNITY
End: 2024-11-04 | Stop reason: SDUPTHER

## 2024-11-04 RX ORDER — MINOXIDIL 2.5 MG/1
2.5 TABLET ORAL DAILY
COMMUNITY
End: 2024-11-04 | Stop reason: SDUPTHER

## 2024-11-04 RX ORDER — FINASTERIDE 1 MG/1
1 TABLET, FILM COATED ORAL DAILY
Qty: 30 TABLET | Refills: 2 | Status: SHIPPED | OUTPATIENT
Start: 2024-11-04

## 2024-11-04 RX ORDER — ALBUTEROL SULFATE 90 UG/1
1-2 INHALANT RESPIRATORY (INHALATION) EVERY 6 HOURS PRN
Start: 2024-11-04

## 2024-11-04 RX ORDER — MINOXIDIL 2.5 MG/1
2.5 TABLET ORAL DAILY
Qty: 30 TABLET | Refills: 2 | Status: SHIPPED | OUTPATIENT
Start: 2024-11-04

## 2024-11-04 NOTE — ASSESSMENT & PLAN NOTE
Chronic BMI today 25.8 discussed portion control low-carb low-fat diet increase physical activity

## 2024-11-04 NOTE — ASSESSMENT & PLAN NOTE
Advice and education were given regarding nutrition, aerobic exercises, weight-bearing exercises, cardiovascular risk reduction, fall risk reduction, and age-appropriate supplements.     The patient was counseled regarding instructions for management, risk factor reductions, prognosis, risks and benefits of treatment options, patient and family education, and importance of compliance with treatment.  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

## 2024-11-04 NOTE — PROGRESS NOTES
Adult Annual Physical  Name: Ananth Dailey      : 2000      MRN: [id number]  Encounter Provider: Esther Smith MD  Encounter Date: 2024   Encounter department: [facility]    Assessment & Plan  Encounter for well adult exam with abnormal findings  Advice and education were given regarding nutrition, aerobic exercises, weight-bearing exercises, cardiovascular risk reduction, fall risk reduction, and age-appropriate supplements.     The patient was counseled regarding instructions for management, risk factor reductions, prognosis, risks and benefits of treatment options, patient and family education, and importance of compliance with treatment.  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         Overweight (BMI 25.0-29.9)  Chronic BMI today 25.8 discussed portion control low-carb low-fat diet increase physical activity         Alopecia  Chronic patient had a visit virtually with the dermatology who recommend minoxidil I will and finasteride and patient requested referral to be submitted and verify if covered by the insurance he tolerated well without side effect  Orders:  •  minoxidil (LONITEN) 2.5 mg tablet; Take 1 tablet (2.5 mg total) by mouth daily  •  finasteride (PROPECIA) 1 MG tablet; Take 1 tablet (1 mg total) by mouth daily    Mild intermittent asthma without complication  Chronic status post recent upper respiratory infection persistent to have a cough recommend to use the Flovent 2 puffs twice a day proper use and possible side effect review and albuterol inhaler on as needed basis  Orders:  •  fluticasone (Flovent HFA) 110 MCG/ACT inhaler; Inhale 2 puffs 2 (two) times a day Rinse mouth after use.  •  albuterol (Proventil HFA) 90 mcg/act inhaler; Inhale 1-2 puffs every 6 (six) hours as needed for wheezing or shortness of breath    Immunizations and preventive care screenings were discussed with patient today. Appropriate  education was printed on patient's after visit summary.    Counseling:  Alcohol/drug use: discussed moderation in alcohol intake, the recommendations for healthy alcohol use, and avoidance of illicit drug use.  Dental Health: discussed importance of regular tooth brushing, flossing, and dental visits.  Injury prevention: discussed safety/seat belts, safety helmets, smoke detectors, carbon monoxide detectors, and smoking near bedding or upholstery.  Sexual health: discussed sexually transmitted diseases, partner selection, use of condoms, avoidance of unintended pregnancy, and contraceptive alternatives.  Exercise: the importance of regular exercise/physical activity was discussed. Recommend exercise 3-5 times per week for at least 30 minutes.          History of Present Illness   Patient here for well exam and he is concerned about cough patient who known to have history of mild intermittent asthma he had upper respiratory infection more than 3 weeks ago and he persistent to have a cough his cough dry and it can happen anytime of the day not related to possible or exercise not related to food denies any wheezing no dyspnea on exertion no lower extremity edema he is not smoker  Also since been seen last time patient will been concerned about hair loss he had a virtual visit with dermatology who recommended to start him on finasteride and minoxidil and patient tolerated well and he is happy with the result   past medical surgical family and social history discussed with the patient    Adult Annual Physical:  Patient presents for annual physical.     Diet and Physical Activity:  - Diet/Nutrition:. no special diet  - Exercise: 3-4 times a week on average.    General Health:  - Sleep: sleeps well.  - Hearing: normal hearing bilateral ears.  - Vision: no vision problems.  - Dental: regular dental visits.     Health:    - Urinary symptoms: none.     Review of Systems   Constitutional:  Negative for chills and fever.  "  HENT:  Negative for ear pain and sore throat.    Eyes:  Negative for pain and visual disturbance.   Respiratory:  Positive for cough. Negative for shortness of breath.    Cardiovascular:  Negative for chest pain and palpitations.   Gastrointestinal:  Negative for abdominal pain, constipation, diarrhea and vomiting.   Genitourinary:  Negative for dysuria and hematuria.   Musculoskeletal:  Negative for arthralgias and back pain.   Skin:  Negative for color change and rash.   Neurological:  Negative for seizures and syncope.   Hematological:  Does not bruise/bleed easily.   Psychiatric/Behavioral:  Negative for behavioral problems.    All other systems reviewed and are negative.        Objective     /80 (BP Location: Left arm, Patient Position: Sitting, Cuff Size: Standard)   Pulse 85   Temp 98.8 °F (37.1 °C) (Tympanic)   Ht 5' 6.14\" (1.68 m)   Wt 72.9 kg (160 lb 12.8 oz)   SpO2 99%   BMI 25.84 kg/m²     Physical Exam  Vitals and nursing note reviewed.   Constitutional:       General: He is not in acute distress.     Appearance: He is well-developed. He is not diaphoretic.   HENT:      Head: Normocephalic.      Right Ear: Tympanic membrane and external ear normal.      Left Ear: Tympanic membrane and external ear normal.      Nose: No rhinorrhea.      Mouth/Throat:      Pharynx: No posterior oropharyngeal erythema.   Eyes:      General:         Right eye: No discharge.         Left eye: No discharge.      Conjunctiva/sclera: Conjunctivae normal.   Neck:      Vascular: No JVD.   Cardiovascular:      Rate and Rhythm: Normal rate and regular rhythm.      Heart sounds: Normal heart sounds. No murmur heard.     No gallop.   Pulmonary:      Effort: Pulmonary effort is normal. No respiratory distress.      Breath sounds: Normal breath sounds. No wheezing.   Abdominal:      General: There is no distension.      Palpations: Abdomen is soft.      Tenderness: There is no abdominal tenderness. There is no rebound. "   Musculoskeletal:         General: No tenderness.      Cervical back: Normal range of motion and neck supple.   Lymphadenopathy:      Cervical: No cervical adenopathy.   Skin:     General: Skin is warm.      Findings: No rash.   Neurological:      Mental Status: He is alert and oriented to person, place, and time.

## 2024-11-05 ENCOUNTER — RA CDI HCC (OUTPATIENT)
Dept: OTHER | Facility: HOSPITAL | Age: 24
End: 2024-11-05

## 2024-11-05 PROBLEM — L65.9 ALOPECIA: Status: ACTIVE | Noted: 2024-11-05

## 2024-11-05 NOTE — ASSESSMENT & PLAN NOTE
Chronic status post recent upper respiratory infection persistent to have a cough recommend to use the Flovent 2 puffs twice a day proper use and possible side effect review and albuterol inhaler on as needed basis  Orders:    fluticasone (Flovent HFA) 110 MCG/ACT inhaler; Inhale 2 puffs 2 (two) times a day Rinse mouth after use.    albuterol (Proventil HFA) 90 mcg/act inhaler; Inhale 1-2 puffs every 6 (six) hours as needed for wheezing or shortness of breath

## 2024-11-05 NOTE — ASSESSMENT & PLAN NOTE
Chronic patient had a visit virtually with the dermatology who recommend minoxidil I will and finasteride and patient requested referral to be submitted and verify if covered by the insurance he tolerated well without side effect  Orders:    minoxidil (LONITEN) 2.5 mg tablet; Take 1 tablet (2.5 mg total) by mouth daily    finasteride (PROPECIA) 1 MG tablet; Take 1 tablet (1 mg total) by mouth daily

## 2024-11-11 ENCOUNTER — OFFICE VISIT (OUTPATIENT)
Dept: OBGYN CLINIC | Facility: MEDICAL CENTER | Age: 24
End: 2024-11-11
Payer: COMMERCIAL

## 2024-11-11 ENCOUNTER — APPOINTMENT (OUTPATIENT)
Dept: RADIOLOGY | Facility: MEDICAL CENTER | Age: 24
End: 2024-11-11
Payer: COMMERCIAL

## 2024-11-11 VITALS
BODY MASS INDEX: 25.88 KG/M2 | HEIGHT: 66 IN | HEART RATE: 69 BPM | SYSTOLIC BLOOD PRESSURE: 113 MMHG | DIASTOLIC BLOOD PRESSURE: 69 MMHG | WEIGHT: 161 LBS

## 2024-11-11 DIAGNOSIS — M23.92 INTERNAL DERANGEMENT OF LEFT KNEE: Primary | ICD-10-CM

## 2024-11-11 DIAGNOSIS — Z01.89 ENCOUNTER FOR LOWER EXTREMITY COMPARISON IMAGING STUDY: ICD-10-CM

## 2024-11-11 DIAGNOSIS — M25.562 LEFT KNEE PAIN, UNSPECIFIED CHRONICITY: ICD-10-CM

## 2024-11-11 DIAGNOSIS — M76.32 IT BAND SYNDROME, LEFT: ICD-10-CM

## 2024-11-11 PROCEDURE — 73564 X-RAY EXAM KNEE 4 OR MORE: CPT

## 2024-11-11 PROCEDURE — 99203 OFFICE O/P NEW LOW 30 MIN: CPT | Performed by: ORTHOPAEDIC SURGERY

## 2024-11-11 PROCEDURE — 73562 X-RAY EXAM OF KNEE 3: CPT

## 2024-11-11 NOTE — PATIENT INSTRUCTIONS
Recommend Applying Voltaren gel to the outer portion of the knee, 4 times a day as needed for pain

## 2024-11-11 NOTE — PROGRESS NOTES
Ortho Sports Medicine Knee New Patient Visit     Assesment:   24 y.o. male left knee lateral pain likely IT band tendonitis vs possible lateral meniscus tear    Plan:    Conservative treatment:    Patient reports the office with longstanding lateral aspect of knee pain.  Patient does get increased pain and symptoms with twisting and pivoting type motions suggesting that a lateral meniscus tear could be present.  Instructed that the more likely is IT band tendinitis but due to his young age I recommend MRI at this time because if he does have a meniscus tear then I would recommend surgical intervention for joint preservation.  Instructed that I will call the patient with MRI results.  If the patient does have a meniscus tear then he will need to come in to the office to review the MRI and discuss surgical intervention.  If the patient does not have meniscus tear that I would recommend that he proceed forward physical therapy.  Ice to knee for 20 minutes at least 1-2 times daily.  Recommend applying Voltaren gel to the lateral aspect of the knee     Imaging:    All imaging from today was reviewed by myself and explained to the patient.   We will obtain an MRI of the knee to rule out internal derangement due to his young age, if he has a meniscus tear then he would require surgery for joint preservation.  Follow up in 1-2 weeks for MRI review. Will make a definitive treatment plan based on the results of the MRI.  Instructed that we will call the patient with the results      Injection:    No Injection planned at this time.      Surgery:     No surgery is recommended at this point, continue with conservative treatment plan as noted.      Follow up:    Return in about 6 weeks (around 12/23/2024). 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      Pain is located lateral.  The patient rates the pain as a 3/10 at rest, 8/10 with activity.  The pain has been present for 3 months.      The patient Denies injury or trauma.  Patient states that the pain has been present for the past 3 months.  Patient knows the exact date of when the pain began but again denies any slight injury or twisting or pivoting type motion creating this pain or pop.  He states that the pain is in the lateral aspect of the knee and described as a constant dull ache.  Patient then gets an increase of pain with any twisting or pivoting type motions.  Patient is also unable to perform flexion beyond 100 degrees without getting significant pain in the lateral aspect of the knee.  Patient denies any injury to this knee in the past.  Patient denies any treatment options thus far.  Patient denies any over-the-counter medications, physical therapy, injection, or prior surgical interventions to this knee.  Patient does note situational instability.    Pain is improved by rest.  Pain is aggravated by running and pivoting on a planted foot.    Symptoms include instability.     The patient has tried rest and ice.          Knee Surgical History:  None    Past Medical, Social and Family History:  Past Medical History:   Diagnosis Date    Asthma     Impetigo 10/29/2020    Other fatigue 6/9/2021     Past Surgical History:   Procedure Laterality Date    OTHER SURGICAL HISTORY  11/2014    CHIN/CHEEK SURGERY     Allergies   Allergen Reactions    Pollen Extract Swelling     Pt states pollen from fruits causes inflammation     Current Outpatient Medications on File Prior to Visit   Medication Sig Dispense Refill    albuterol (Proventil HFA) 90 mcg/act inhaler Inhale 1-2 puffs every 6 (six) hours as needed for wheezing or shortness of breath      finasteride (PROPECIA) 1 MG tablet Take 1 tablet (1 mg  total) by mouth daily 30 tablet 2    fluticasone (Flovent HFA) 110 MCG/ACT inhaler Inhale 2 puffs 2 (two) times a day Rinse mouth after use. 12 g 1    lactase (LACTAID) 3,000 units tablet Take 1 tablet (3,000 Units total) by mouth 3 (three) times a day with meals 90 tablet 0    levocetirizine (XYZAL) 5 MG tablet TAKE 1 TABLET BY MOUTH EVERY DAY IN THE EVENING 90 tablet 0    minoxidil (LONITEN) 2.5 mg tablet Take 1 tablet (2.5 mg total) by mouth daily 30 tablet 2    EPINEPHrine (EPIPEN) 0.3 mg/0.3 mL SOAJ Inject 0.3 mL (0.3 mg total) into a muscle once for 1 dose 0.6 mL 0     No current facility-administered medications on file prior to visit.     Social History     Socioeconomic History    Marital status: Single     Spouse name: Not on file    Number of children: Not on file    Years of education: Not on file    Highest education level: Not on file   Occupational History    Not on file   Tobacco Use    Smoking status: Never    Smokeless tobacco: Never   Vaping Use    Vaping status: Never Used   Substance and Sexual Activity    Alcohol use: Yes     Comment: social    Drug use: No    Sexual activity: Never   Other Topics Concern    Not on file   Social History Narrative    Do you have pets? none Is pet allowed in bedroom?No    Are you a smoker? Never    Does anyone smoke in your home? No       Do you live with smokers? No    [place] frequently? No   How many times a year? N/A      Social Determinants of Health     Financial Resource Strain: Low Risk  (6/9/2021)    Overall Financial Resource Strain (CARDIA)     Difficulty of Paying Living Expenses: Not hard at all   Food Insecurity: No Food Insecurity (6/9/2021)    Hunger Vital Sign     Worried About Running Out of Food in the Last Year: Never true     Ran Out of Food in the Last Year: Never true   Transportation Needs: No Transportation Needs (6/9/2021)    PRAPARE - Transportation     Lack of Transportation (Medical): No     Lack of Transportation (Non-Medical):  "No   Physical Activity: Insufficiently Active (4/23/2021)    Exercise Vital Sign     Days of Exercise per Week: 2 days     Minutes of Exercise per Session: 30 min   Stress: No Stress Concern Present (4/23/2021)    [employer] - Occupational Stress Questionnaire     Feeling of Stress : Not at all   Social Connections: Socially Isolated (4/23/2021)    Social Connection and Isolation Panel [NHANES]     Frequency of Communication with Friends and Family: More than three times a week     Frequency of Social Gatherings with Friends and Family: More than three times a week     Attends Evangelical Services: Never     Active Member of Clubs or Organizations: No     Attends Club or Organization Meetings: Never     Marital Status: Never    Intimate Partner Violence: Not At Risk (4/23/2021)    Humiliation, Afraid, Rape, and Kick questionnaire     Fear of Current or Ex-Partner: No     Emotionally Abused: No     Physically Abused: No     Sexually Abused: No   Housing Stability: Not on file         I have reviewed the past medical, surgical, social and family history, medications and allergies as documented in the EMR.    Review of systems: ROS is negative other than that noted in the HPI.  Constitutional: Negative for fatigue and fever.   HENT: Negative for sore throat.    Respiratory: Negative for shortness of breath.    Cardiovascular: Negative for chest pain.   Gastrointestinal: Negative for abdominal pain.   Endocrine: Negative for cold intolerance and heat intolerance.   Genitourinary: Negative for flank pain.   Musculoskeletal: Negative for back pain.   Skin: Negative for rash.   Allergic/Immunologic: Negative for immunocompromised state.   Neurological: Negative for dizziness.   Psychiatric/Behavioral: Negative for agitation.      Physical Exam:    Blood pressure 113/69, pulse 69, height 5' 6.14\" (1.68 m), weight 73 kg (161 lb).    General/Constitutional: NAD, well developed, well " nourished  HENT: Normocephalic, atraumatic  CV: Intact distal pulses, regular rate  Resp: No respiratory distress or labored breathing  GI: Soft and non-tender   Lymphatic: No lymphadenopathy palpated  Neuro: Alert and Oriented x 3, no focal deficits  Psych: Normal mood, normal affect, normal judgement, normal behavior  Skin: Warm, dry, no rashes, no erythema      Knee Exam (focused):                RIGHT LEFT   ROM:   0-130 0-130   Palpation: Effusion negative negative     MJL tenderness Negative Negative     LJL tenderness Negative Positive   Meniscus: Aniya Negative Positive    Apley's Compression Negative Positive   Instability: Varus stable stable     Valgus stable stable   Special Tests: Lachman Negative Negative     Posterior drawer Negative Negative     Anterior drawer Negative Negative     Pivot shift not tested not tested     Dial not tested not tested   Patella: Palpation no tenderness no tenderness     Mobility 1/4 1/4     Apprehension Negative Negative   Other: Single leg 1/4 squat not tested not tested      Left knee: TTP about the distal IT band and pain laterally with hyperflexion     LE NV Exam: +2 DP/PT pulses bilaterally  Sensation intact to light touch L2-S1 bilaterally     Bilateral hip ROM demonstrates no pain actively or passively    No calf tenderness to palpation bilaterally    Knee Imaging    X-rays of the left knee were reviewed, which demonstrate no acute fracture or osseous abnormality.  I have reviewed the radiology report and agree with their impression.

## 2024-11-26 DIAGNOSIS — L65.9 ALOPECIA: ICD-10-CM

## 2024-11-29 RX ORDER — MINOXIDIL 2.5 MG/1
2.5 TABLET ORAL DAILY
Qty: 90 TABLET | Refills: 0 | Status: SHIPPED | OUTPATIENT
Start: 2024-11-29

## 2024-12-11 ENCOUNTER — HOSPITAL ENCOUNTER (EMERGENCY)
Facility: HOSPITAL | Age: 24
Discharge: HOME | End: 2024-12-11
Attending: EMERGENCY MEDICINE
Payer: COMMERCIAL

## 2024-12-11 ENCOUNTER — HOSPITAL ENCOUNTER (OUTPATIENT)
Dept: MRI IMAGING | Facility: HOSPITAL | Age: 24
Discharge: HOME | End: 2024-12-11
Payer: COMMERCIAL

## 2024-12-11 ENCOUNTER — APPOINTMENT (EMERGENCY)
Dept: RADIOLOGY | Facility: HOSPITAL | Age: 24
End: 2024-12-11
Payer: COMMERCIAL

## 2024-12-11 VITALS
DIASTOLIC BLOOD PRESSURE: 65 MMHG | HEART RATE: 84 BPM | TEMPERATURE: 97.5 F | OXYGEN SATURATION: 97 % | BODY MASS INDEX: 25.41 KG/M2 | WEIGHT: 158.07 LBS | RESPIRATION RATE: 16 BRPM | SYSTOLIC BLOOD PRESSURE: 119 MMHG

## 2024-12-11 DIAGNOSIS — M23.92 INTERNAL DERANGEMENT OF LEFT KNEE: ICD-10-CM

## 2024-12-11 DIAGNOSIS — M25.561 ACUTE PAIN OF RIGHT KNEE: ICD-10-CM

## 2024-12-11 DIAGNOSIS — S80.01XA CONTUSION OF RIGHT KNEE, INITIAL ENCOUNTER: Primary | ICD-10-CM

## 2024-12-11 PROCEDURE — 73564 X-RAY EXAM KNEE 4 OR MORE: CPT

## 2024-12-11 PROCEDURE — 73721 MRI JNT OF LWR EXTRE W/O DYE: CPT

## 2024-12-11 PROCEDURE — 99284 EMERGENCY DEPT VISIT MOD MDM: CPT

## 2024-12-11 PROCEDURE — 99283 EMERGENCY DEPT VISIT LOW MDM: CPT

## 2024-12-12 NOTE — DISCHARGE INSTRUCTIONS
Take Tylenol 1000 mg, and Motrin 600 mg every 6 hours as needed for pain  Rest, ice, compress, elevate the affected extremity  Follow-up with orthopedist as previously scheduled  Return to emergency department increasing fever, body aches, chills, increased swelling of the joint, discoloration of the distal extremity, to a pale or ashen color, or numbness of the distal extremity.

## 2024-12-12 NOTE — ED PROVIDER NOTES
0700-Bedside shift change report given to Chiqui Mo RN (oncoming nurse) by Taye Zheng Rn (offgoing nurse). Report included the following information SBAR, Kardex and MAR. Time reflects when diagnosis was documented in both MDM as applicable and the Disposition within this note       Time User Action Codes Description Comment    12/11/2024 11:17 PM Og Smith [S80.01XA] Contusion of right knee, initial encounter     12/11/2024 11:17 PM Og Smith Add [M25.561] Acute pain of right knee           ED Disposition       ED Disposition   Discharge    Condition   Stable    Date/Time   Wed Dec 11, 2024 11:16 PM    Comment   Ananth Dailey discharge to home/self care.                   Assessment & Plan       Medical Decision Making  Patient is a 24-year-old male present emergency department with Right knee pain has been going on for the past 3 days.  Patient states that he was snowboarding a few days ago and fell on the ice.  Patient complains of striking right knee against the ice.  Patient states for the past 3 days the knee continue to swell with increased bruising. Patient showed full range of motion of the right knee without any laxity with valgus, varus stressors.  Patient also had negative anterior posterior drawer sign.  Knee temperature normal to palpation when compared to the other.  No crepitus palpated over the knee.  Patient neurovascular intact in the distal extremity. DDX including but not limited to: arthritis, bursitis, tendinitis, sprain, strain, fracture, meniscal tear; doubt septic arthritis, osteomyelitis, or DVT or arterial occlusion.  Discussed with patient to take Tylenol 1000 mg, and Motrin 600 mg every 6 hours as needed for pain.  Patient educated to rest, ice, compress, elevate the affected extremity.  Discussed with patient to avoid strenuous activity for a couple of days to avoid further injury.  Discussed with patient to follow-up with orthopedist as previously scheduled.  Patient given strict return precautions to return to emergency department increasing fever, body aches, chills, increased swelling of the joint, discoloration of the distal  extremity, to a pale or ashen color, or numbness of the distal extremity.  Patient verbalized understanding and agrees to current plan.  Patient discharged home in stable condition at this time.    Amount and/or Complexity of Data Reviewed  Radiology: ordered and independent interpretation performed.             Medications - No data to display    ED Risk Strat Scores                          SBIRT 22yo+      Flowsheet Row Most Recent Value   Initial Alcohol Screen: US AUDIT-C     1. How often do you have a drink containing alcohol? 3 Filed at: 12/11/2024 2228   2. How many drinks containing alcohol do you have on a typical day you are drinking?  2 Filed at: 12/11/2024 2228   3a. Male UNDER 65: How often do you have five or more drinks on one occasion? 0 Filed at: 12/11/2024 2228   3b. FEMALE Any Age, or MALE 65+: How often do you have 4 or more drinks on one occassion? 0 Filed at: 12/11/2024 2228   Audit-C Score 5 Filed at: 12/11/2024 2228   ALETA: How many times in the past year have you...    Used an illegal drug or used a prescription medication for non-medical reasons? Never Filed at: 12/11/2024 2228                            History of Present Illness       Chief Complaint   Patient presents with    Knee Injury     Pt states he was snowboarding a few days ago and fell on ice. Pt complaining of pain and swelling to the right knee. Abrasion and bruising noted to the right knee.       Past Medical History:   Diagnosis Date    Asthma     Impetigo 10/29/2020    Other fatigue 6/9/2021      Past Surgical History:   Procedure Laterality Date    OTHER SURGICAL HISTORY  11/2014    CHIN/CHEEK SURGERY      Family History   Problem Relation Age of Onset    Depression Mother     Hyperlipidemia Father     Hypertension Father     Allergies Brother         Cefalosporin    Asthma Maternal Grandmother       Social History     Tobacco Use    Smoking status: Never    Smokeless tobacco: Never   Vaping Use    Vaping status: Never  Used   Substance Use Topics    Alcohol use: Yes     Comment: social    Drug use: No      E-Cigarette/Vaping    E-Cigarette Use Never User       E-Cigarette/Vaping Substances    Nicotine No     THC No     CBD No     Flavoring No     Other No     Unknown No       I have reviewed and agree with the history as documented.     Patient is a 24-year-old male present emergency department with Right knee pain has been going on for the past 3 days.  Patient states that he was snowboarding a few days ago and fell on the ice.  Patient complains of striking right knee against the ice.  Patient states for the past 3 days the knee continue to swell with increased bruising.  Patient also states that he has an abrasion at the tip of the knee that has been applying antibiotic ointment on the wound.  Patient states has been ambulating fine ever since.  Patient denies any fever, aches, chills, increased redness of the joint, inability to walk on joint, numbness or tingling to distal extremity, increased welling of the distal extremity, or any discoloration of the distal extremity.          Review of Systems   Constitutional:  Negative for chills, diaphoresis, fatigue and fever.   HENT:  Negative for congestion, dental problem, ear pain, rhinorrhea, sinus pressure, sinus pain and sore throat.    Eyes:  Negative for pain and visual disturbance.   Respiratory:  Negative for cough, choking, chest tightness, shortness of breath, wheezing and stridor.    Cardiovascular:  Negative for chest pain and palpitations.   Gastrointestinal:  Negative for abdominal pain, constipation, diarrhea, nausea and vomiting.   Genitourinary:  Negative for dysuria and hematuria.   Musculoskeletal:  Positive for arthralgias and joint swelling. Negative for back pain, gait problem and myalgias.   Skin:  Negative for color change and rash.   Neurological:  Negative for seizures and syncope.   All other systems reviewed and are negative.          Objective       ED  Triage Vitals [12/11/24 2229]   Temperature Pulse Blood Pressure Respirations SpO2 Patient Position - Orthostatic VS   97.5 °F (36.4 °C) 84 119/65 16 97 % Sitting      Temp Source Heart Rate Source BP Location FiO2 (%) Pain Score    Oral Monitor Right arm -- 7      Vitals      Date and Time Temp Pulse SpO2 Resp BP Pain Score FACES Pain Rating User   12/11/24 2229 97.5 °F (36.4 °C) 84 97 % 16 119/65 7 -- AB            Physical Exam  Vitals and nursing note reviewed.   Constitutional:       General: He is not in acute distress.     Appearance: Normal appearance. He is not ill-appearing.   HENT:      Head: Normocephalic and atraumatic.      Right Ear: Tympanic membrane normal. There is no impacted cerumen.      Left Ear: Tympanic membrane normal. There is no impacted cerumen.      Nose: No congestion or rhinorrhea.      Mouth/Throat:      Pharynx: No oropharyngeal exudate or posterior oropharyngeal erythema.   Eyes:      General:         Right eye: No discharge.         Left eye: No discharge.      Extraocular Movements: Extraocular movements intact.      Pupils: Pupils are equal, round, and reactive to light.   Cardiovascular:      Rate and Rhythm: Normal rate and regular rhythm.      Pulses: Normal pulses.      Heart sounds: Normal heart sounds. No murmur heard.     No friction rub. No gallop.   Pulmonary:      Effort: No respiratory distress.      Breath sounds: No stridor. No wheezing, rhonchi or rales.   Chest:      Chest wall: No tenderness.   Abdominal:      General: There is no distension.      Tenderness: There is no abdominal tenderness. There is no right CVA tenderness, left CVA tenderness or guarding.   Musculoskeletal:      Cervical back: No rigidity or tenderness.      Comments: Patient showed full range of motion of the knee without any laxity with valgus, varus stressors.  Patient also had negative anterior posterior drawer sign.  Knee temperature normal to palpation when compared to the other.  No  crepitus palpated over the knee.  Patient neurovascular intact in the distal extremity.   Lymphadenopathy:      Cervical: No cervical adenopathy.   Skin:     Capillary Refill: Capillary refill takes less than 2 seconds.      Coloration: Skin is not jaundiced or pale.      Findings: No bruising, erythema or lesion.      Comments: Small 1.5 cm diameter abrasion at the tip of the right knee.  No localized erythema or drainage from site.  Scattered ecchymoses over the medial aspect of the right knee.         Results Reviewed       None            XR knee 4+ views RIGHT   ED Interpretation by Og Smith PA-C (12/11 [id number])   No acute osseous abnormality.          Procedures    ED Medication and Procedure Management   Prior to Admission Medications   Prescriptions Last Dose Informant Patient Reported? Taking?   EPINEPHrine (EPIPEN) 0.3 mg/0.3 mL SOAJ  Self No No   Sig: Inject 0.3 mL (0.3 mg total) into a muscle once for 1 dose   albuterol (Proventil HFA) 90 mcg/act inhaler   No No   Sig: Inhale 1-2 puffs every 6 (six) hours as needed for wheezing or shortness of breath   finasteride (PROPECIA) 1 MG tablet   No No   Sig: Take 1 tablet (1 mg total) by mouth daily   fluticasone (Flovent HFA) 110 MCG/ACT inhaler   No No   Sig: Inhale 2 puffs 2 (two) times a day Rinse mouth after use.   lactase (LACTAID) 3,000 units tablet  Self No No   Sig: Take 1 tablet (3,000 Units total) by mouth 3 (three) times a day with meals   levocetirizine (XYZAL) 5 MG tablet  Self No No   Sig: TAKE 1 TABLET BY MOUTH EVERY DAY IN THE EVENING   minoxidil (LONITEN) 2.5 mg tablet   No No   Sig: TAKE 1 TABLET BY MOUTH EVERY DAY      Facility-Administered Medications: None     Discharge Medication List as of 12/11/2024 11:18 PM        CONTINUE these medications which have NOT CHANGED    Details   albuterol (Proventil HFA) 90 mcg/act inhaler Inhale 1-2 puffs every 6 (six) hours as needed for wheezing or shortness of breath, Starting Mon 11/4/2024, No  Print      EPINEPHrine (EPIPEN) 0.3 mg/0.3 mL SOAJ Inject 0.3 mL (0.3 mg total) into a muscle once for 1 dose, Starting Fri 12/1/2023, Normal      finasteride (PROPECIA) 1 MG tablet Take 1 tablet (1 mg total) by mouth daily, Starting Mon 11/4/2024, Normal      fluticasone (Flovent HFA) 110 MCG/ACT inhaler Inhale 2 puffs 2 (two) times a day Rinse mouth after use., Starting Mon 11/4/2024, Normal      lactase (LACTAID) 3,000 units tablet Take 1 tablet (3,000 Units total) by mouth 3 (three) times a day with meals, Starting Thu 5/30/2024, Normal      levocetirizine (XYZAL) 5 MG tablet TAKE 1 TABLET BY MOUTH EVERY DAY IN THE EVENING, Starting Tue 12/26/2023, Normal      minoxidil (LONITEN) 2.5 mg tablet TAKE 1 TABLET BY MOUTH EVERY DAY, Starting Fri 11/29/2024, Normal           No discharge procedures on file.  ED SEPSIS DOCUMENTATION   Time reflects when diagnosis was documented in both MDM as applicable and the Disposition within this note       Time User Action Codes Description Comment    12/11/2024 11:17 PM Og Smith [S80.01XA] Contusion of right knee, initial encounter     12/11/2024 11:17 PM Og Smith [M25.561] Acute pain of right knee                  Og Smith PA-C  12/12/24 0121

## 2025-02-27 DIAGNOSIS — L65.9 ALOPECIA: ICD-10-CM

## 2025-02-27 RX ORDER — MINOXIDIL 2.5 MG/1
2.5 TABLET ORAL DAILY
Qty: 90 TABLET | Refills: 1 | Status: SHIPPED | OUTPATIENT
Start: 2025-02-27

## 2025-06-14 DIAGNOSIS — L65.9 ALOPECIA: ICD-10-CM

## 2025-06-15 RX ORDER — FINASTERIDE 1 MG/1
1 TABLET, FILM COATED ORAL DAILY
Qty: 90 TABLET | Refills: 1 | Status: SHIPPED | OUTPATIENT
Start: 2025-06-15